# Patient Record
Sex: FEMALE | Race: BLACK OR AFRICAN AMERICAN | HISPANIC OR LATINO | Employment: FULL TIME | URBAN - METROPOLITAN AREA
[De-identification: names, ages, dates, MRNs, and addresses within clinical notes are randomized per-mention and may not be internally consistent; named-entity substitution may affect disease eponyms.]

---

## 2017-01-23 ENCOUNTER — HOSPITAL ENCOUNTER (EMERGENCY)
Facility: HOSPITAL | Age: 27
Discharge: HOME | End: 2017-01-23
Attending: EMERGENCY MEDICINE | Admitting: EMERGENCY MEDICINE

## 2017-01-23 VITALS
TEMPERATURE: 98.5 F | WEIGHT: 160 LBS | OXYGEN SATURATION: 100 % | DIASTOLIC BLOOD PRESSURE: 60 MMHG | SYSTOLIC BLOOD PRESSURE: 129 MMHG | RESPIRATION RATE: 17 BRPM | HEART RATE: 108 BPM

## 2017-01-23 DIAGNOSIS — R10.13 ACUTE EPIGASTRIC PAIN: Primary | ICD-10-CM

## 2017-01-23 LAB
ALBUMIN SERPL BCP-MCNC: 3.4 G/DL (ref 3.5–5)
ALP SERPL-CCNC: 64 U/L (ref 46–116)
ALT SERPL W P-5'-P-CCNC: 23 U/L (ref 12–78)
ANION GAP SERPL CALCULATED.3IONS-SCNC: 10 MMOL/L (ref 4–13)
AST SERPL W P-5'-P-CCNC: 18 U/L (ref 5–45)
BACTERIA UR QL AUTO: ABNORMAL /HPF
BASOPHILS # BLD AUTO: 0.02 THOUSANDS/ΜL (ref 0–0.1)
BASOPHILS NFR BLD AUTO: 0 % (ref 0–1)
BILIRUB SERPL-MCNC: 0.34 MG/DL (ref 0.2–1)
BILIRUB UR QL STRIP: NEGATIVE
BUN SERPL-MCNC: 11 MG/DL (ref 5–25)
CALCIUM SERPL-MCNC: 8.7 MG/DL (ref 8.3–10.1)
CHLORIDE SERPL-SCNC: 104 MMOL/L (ref 100–108)
CLARITY UR: CLEAR
CO2 SERPL-SCNC: 26 MMOL/L (ref 21–32)
COLOR UR: YELLOW
CREAT SERPL-MCNC: 0.53 MG/DL (ref 0.6–1.3)
EOSINOPHIL # BLD AUTO: 0.02 THOUSAND/ΜL (ref 0–0.61)
EOSINOPHIL NFR BLD AUTO: 0 % (ref 0–6)
ERYTHROCYTE [DISTWIDTH] IN BLOOD BY AUTOMATED COUNT: 15.9 % (ref 11.6–15.1)
GFR SERPL CREATININE-BSD FRML MDRD: >60 ML/MIN/1.73SQ M
GLUCOSE SERPL-MCNC: 92 MG/DL (ref 65–140)
GLUCOSE UR STRIP-MCNC: NEGATIVE MG/DL
HCG UR QL: NEGATIVE
HCT VFR BLD AUTO: 41 % (ref 34.8–46.1)
HGB BLD-MCNC: 13.1 G/DL (ref 11.5–15.4)
HGB UR QL STRIP.AUTO: ABNORMAL
KETONES UR STRIP-MCNC: NEGATIVE MG/DL
LEUKOCYTE ESTERASE UR QL STRIP: NEGATIVE
LIPASE SERPL-CCNC: 119 U/L (ref 73–393)
LYMPHOCYTES # BLD AUTO: 1.06 THOUSANDS/ΜL (ref 0.6–4.47)
LYMPHOCYTES NFR BLD AUTO: 10 % (ref 14–44)
MCH RBC QN AUTO: 24.3 PG (ref 26.8–34.3)
MCHC RBC AUTO-ENTMCNC: 32 G/DL (ref 31.4–37.4)
MCV RBC AUTO: 76 FL (ref 82–98)
MONOCYTES # BLD AUTO: 0.53 THOUSAND/ΜL (ref 0.17–1.22)
MONOCYTES NFR BLD AUTO: 5 % (ref 4–12)
MUCOUS THREADS UR QL AUTO: ABNORMAL
NEUTROPHILS # BLD AUTO: 8.61 THOUSANDS/ΜL (ref 1.85–7.62)
NEUTS SEG NFR BLD AUTO: 85 % (ref 43–75)
NITRITE UR QL STRIP: NEGATIVE
NON-SQ EPI CELLS URNS QL MICRO: ABNORMAL /HPF
NRBC BLD AUTO-RTO: 0 /100 WBCS
PH UR STRIP.AUTO: 5.5 [PH] (ref 4.5–8)
PLATELET # BLD AUTO: 270 THOUSANDS/UL (ref 149–390)
PMV BLD AUTO: 9.8 FL (ref 8.9–12.7)
POTASSIUM SERPL-SCNC: 3.7 MMOL/L (ref 3.5–5.3)
PROT SERPL-MCNC: 7.4 G/DL (ref 6.4–8.2)
PROT UR STRIP-MCNC: NEGATIVE MG/DL
RBC # BLD AUTO: 5.38 MILLION/UL (ref 3.81–5.12)
RBC #/AREA URNS AUTO: ABNORMAL /HPF
SODIUM SERPL-SCNC: 140 MMOL/L (ref 136–145)
SP GR UR STRIP.AUTO: >=1.03 (ref 1–1.03)
UROBILINOGEN UR QL STRIP.AUTO: 0.2 E.U./DL
WBC # BLD AUTO: 10.24 THOUSAND/UL (ref 4.31–10.16)
WBC #/AREA URNS AUTO: ABNORMAL /HPF

## 2017-01-23 PROCEDURE — 99283 EMERGENCY DEPT VISIT LOW MDM: CPT

## 2017-01-23 PROCEDURE — 80053 COMPREHEN METABOLIC PANEL: CPT | Performed by: EMERGENCY MEDICINE

## 2017-01-23 PROCEDURE — 85025 COMPLETE CBC W/AUTO DIFF WBC: CPT | Performed by: EMERGENCY MEDICINE

## 2017-01-23 PROCEDURE — 81002 URINALYSIS NONAUTO W/O SCOPE: CPT

## 2017-01-23 PROCEDURE — 87086 URINE CULTURE/COLONY COUNT: CPT

## 2017-01-23 PROCEDURE — 81025 URINE PREGNANCY TEST: CPT

## 2017-01-23 PROCEDURE — 83690 ASSAY OF LIPASE: CPT | Performed by: EMERGENCY MEDICINE

## 2017-01-23 PROCEDURE — 81001 URINALYSIS AUTO W/SCOPE: CPT

## 2017-01-23 PROCEDURE — 36415 COLL VENOUS BLD VENIPUNCTURE: CPT | Performed by: EMERGENCY MEDICINE

## 2017-01-23 PROCEDURE — 96360 HYDRATION IV INFUSION INIT: CPT

## 2017-01-23 RX ORDER — MAGNESIUM HYDROXIDE/ALUMINUM HYDROXICE/SIMETHICONE 120; 1200; 1200 MG/30ML; MG/30ML; MG/30ML
30 SUSPENSION ORAL ONCE
Status: COMPLETED | OUTPATIENT
Start: 2017-01-23 | End: 2017-01-23

## 2017-01-23 RX ORDER — FAMOTIDINE 20 MG/1
20 TABLET, FILM COATED ORAL 2 TIMES DAILY
Qty: 14 TABLET | Refills: 0 | Status: SHIPPED | OUTPATIENT
Start: 2017-01-23 | End: 2017-01-30

## 2017-01-23 RX ORDER — SUCRALFATE 1 G/1
1 TABLET ORAL 4 TIMES DAILY
Qty: 28 TABLET | Refills: 0 | Status: SHIPPED | OUTPATIENT
Start: 2017-01-23 | End: 2018-03-18 | Stop reason: HOSPADM

## 2017-01-23 RX ADMIN — ALUMINUM HYDROXIDE, MAGNESIUM HYDROXIDE, AND SIMETHICONE 30 ML: 200; 200; 20 SUSPENSION ORAL at 17:19

## 2017-01-23 RX ADMIN — SODIUM CHLORIDE 1000 ML: 0.9 INJECTION, SOLUTION INTRAVENOUS at 17:20

## 2017-01-23 RX ADMIN — LIDOCAINE HYDROCHLORIDE 15 ML: 20 SOLUTION ORAL; TOPICAL at 17:19

## 2017-01-24 LAB — BACTERIA UR CULT: NORMAL

## 2017-03-24 ENCOUNTER — HOSPITAL ENCOUNTER (EMERGENCY)
Facility: HOSPITAL | Age: 27
Discharge: HOME | End: 2017-03-25
Admitting: EMERGENCY MEDICINE
Payer: COMMERCIAL

## 2017-03-24 VITALS
SYSTOLIC BLOOD PRESSURE: 111 MMHG | DIASTOLIC BLOOD PRESSURE: 56 MMHG | OXYGEN SATURATION: 99 % | RESPIRATION RATE: 16 BRPM | TEMPERATURE: 98.6 F | HEART RATE: 88 BPM | WEIGHT: 155 LBS

## 2017-03-24 DIAGNOSIS — R39.9 UTI SYMPTOMS: Primary | ICD-10-CM

## 2017-03-24 LAB
BILIRUB UR QL STRIP: NEGATIVE
CLARITY UR: CLEAR
CLARITY, POC: CLEAR
COLOR UR: YELLOW
COLOR, POC: YELLOW
GLUCOSE UR STRIP-MCNC: NEGATIVE MG/DL
HCG UR QL: NEGATIVE
HGB UR QL STRIP.AUTO: NEGATIVE
KETONES UR STRIP-MCNC: NEGATIVE MG/DL
LEUKOCYTE ESTERASE UR QL STRIP: NEGATIVE
NITRITE UR QL STRIP: NEGATIVE
PH UR STRIP.AUTO: 7 [PH] (ref 4.5–8)
PROT UR STRIP-MCNC: NEGATIVE MG/DL
SP GR UR STRIP.AUTO: 1.02 (ref 1–1.03)
UROBILINOGEN UR QL STRIP.AUTO: 1 E.U./DL

## 2017-03-24 PROCEDURE — 81003 URINALYSIS AUTO W/O SCOPE: CPT

## 2017-03-24 PROCEDURE — 81025 URINE PREGNANCY TEST: CPT | Performed by: PHYSICIAN ASSISTANT

## 2017-03-24 PROCEDURE — 81002 URINALYSIS NONAUTO W/O SCOPE: CPT | Performed by: PHYSICIAN ASSISTANT

## 2017-03-24 RX ORDER — CEPHALEXIN 500 MG/1
500 CAPSULE ORAL 2 TIMES DAILY
Qty: 6 CAPSULE | Refills: 0 | Status: SHIPPED | OUTPATIENT
Start: 2017-03-24 | End: 2017-03-27

## 2017-03-25 PROCEDURE — 99283 EMERGENCY DEPT VISIT LOW MDM: CPT

## 2017-07-15 ENCOUNTER — HOSPITAL ENCOUNTER (EMERGENCY)
Facility: HOSPITAL | Age: 27
Discharge: HOME | End: 2017-07-15
Attending: EMERGENCY MEDICINE | Admitting: EMERGENCY MEDICINE
Payer: COMMERCIAL

## 2017-07-15 VITALS
SYSTOLIC BLOOD PRESSURE: 114 MMHG | OXYGEN SATURATION: 99 % | RESPIRATION RATE: 18 BRPM | TEMPERATURE: 97.9 F | DIASTOLIC BLOOD PRESSURE: 66 MMHG | WEIGHT: 172 LBS | HEART RATE: 87 BPM

## 2017-07-15 DIAGNOSIS — Z34.90 PREGNANCY: ICD-10-CM

## 2017-07-15 DIAGNOSIS — J02.9 ACUTE VIRAL PHARYNGITIS: Primary | ICD-10-CM

## 2017-07-15 DIAGNOSIS — H92.02 OTALGIA OF LEFT EAR: ICD-10-CM

## 2017-07-15 PROCEDURE — 99282 EMERGENCY DEPT VISIT SF MDM: CPT

## 2017-07-24 ENCOUNTER — GENERIC CONVERSION - ENCOUNTER (OUTPATIENT)
Dept: OTHER | Facility: OTHER | Age: 27
End: 2017-07-24

## 2017-07-24 ENCOUNTER — ALLSCRIPTS OFFICE VISIT (OUTPATIENT)
Dept: OTHER | Facility: OTHER | Age: 27
End: 2017-07-24

## 2017-07-24 DIAGNOSIS — Z34.91 ENCOUNTER FOR SUPERVISION OF NORMAL PREGNANCY IN FIRST TRIMESTER: ICD-10-CM

## 2017-08-07 ENCOUNTER — HOSPITAL ENCOUNTER (EMERGENCY)
Facility: HOSPITAL | Age: 27
Discharge: HOME | End: 2017-08-07
Attending: EMERGENCY MEDICINE
Payer: COMMERCIAL

## 2017-08-07 ENCOUNTER — APPOINTMENT (OUTPATIENT)
Dept: LAB | Facility: HOSPITAL | Age: 27
End: 2017-08-07
Payer: COMMERCIAL

## 2017-08-07 VITALS
SYSTOLIC BLOOD PRESSURE: 121 MMHG | DIASTOLIC BLOOD PRESSURE: 61 MMHG | HEART RATE: 84 BPM | OXYGEN SATURATION: 98 % | TEMPERATURE: 98.9 F | RESPIRATION RATE: 18 BRPM | WEIGHT: 175.4 LBS

## 2017-08-07 DIAGNOSIS — K59.00 CONSTIPATION: ICD-10-CM

## 2017-08-07 DIAGNOSIS — Z34.90 INTRAUTERINE PREGNANCY: Primary | ICD-10-CM

## 2017-08-07 DIAGNOSIS — R10.9 ACUTE ABDOMINAL PAIN: ICD-10-CM

## 2017-08-07 DIAGNOSIS — Z34.91 ENCOUNTER FOR SUPERVISION OF NORMAL PREGNANCY IN FIRST TRIMESTER: ICD-10-CM

## 2017-08-07 LAB
ABO GROUP BLD: NORMAL
BACTERIA UR QL AUTO: ABNORMAL /HPF
BACTERIA UR QL AUTO: ABNORMAL /HPF
BASOPHILS # BLD AUTO: 0.02 THOUSANDS/ΜL (ref 0–0.1)
BASOPHILS NFR BLD AUTO: 0 % (ref 0–1)
BILIRUB UR QL STRIP: NEGATIVE
BILIRUB UR QL STRIP: NEGATIVE
BLD GP AB SCN SERPL QL: NEGATIVE
CAOX CRY URNS QL MICRO: ABNORMAL /HPF
CLARITY UR: ABNORMAL
CLARITY UR: CLEAR
COLOR UR: YELLOW
COLOR UR: YELLOW
EOSINOPHIL # BLD AUTO: 0.02 THOUSAND/ΜL (ref 0–0.61)
EOSINOPHIL NFR BLD AUTO: 0 % (ref 0–6)
ERYTHROCYTE [DISTWIDTH] IN BLOOD BY AUTOMATED COUNT: 15.8 % (ref 11.6–15.1)
EXTERNAL GROUP B STREP ANTIGEN: POSITIVE
GLUCOSE 1H P 50 G GLC PO SERPL-MCNC: 123 MG/DL
GLUCOSE UR STRIP-MCNC: NEGATIVE MG/DL
GLUCOSE UR STRIP-MCNC: NEGATIVE MG/DL
HCT VFR BLD AUTO: 42.3 % (ref 34.8–46.1)
HGB BLD-MCNC: 14.1 G/DL (ref 11.5–15.4)
HGB UR QL STRIP.AUTO: ABNORMAL
HGB UR QL STRIP.AUTO: ABNORMAL
KETONES UR STRIP-MCNC: ABNORMAL MG/DL
KETONES UR STRIP-MCNC: NEGATIVE MG/DL
LEUKOCYTE ESTERASE UR QL STRIP: NEGATIVE
LEUKOCYTE ESTERASE UR QL STRIP: NEGATIVE
LYMPHOCYTES # BLD AUTO: 2.13 THOUSANDS/ΜL (ref 0.6–4.47)
LYMPHOCYTES NFR BLD AUTO: 21 % (ref 14–44)
MCH RBC QN AUTO: 27.2 PG (ref 26.8–34.3)
MCHC RBC AUTO-ENTMCNC: 33.3 G/DL (ref 31.4–37.4)
MCV RBC AUTO: 82 FL (ref 82–98)
MONOCYTES # BLD AUTO: 0.73 THOUSAND/ΜL (ref 0.17–1.22)
MONOCYTES NFR BLD AUTO: 7 % (ref 4–12)
MUCOUS THREADS UR QL AUTO: ABNORMAL
MUCOUS THREADS UR QL AUTO: ABNORMAL
NEUTROPHILS # BLD AUTO: 7.25 THOUSANDS/ΜL (ref 1.85–7.62)
NEUTS SEG NFR BLD AUTO: 72 % (ref 43–75)
NITRITE UR QL STRIP: NEGATIVE
NITRITE UR QL STRIP: NEGATIVE
NON-SQ EPI CELLS URNS QL MICRO: ABNORMAL /HPF
NON-SQ EPI CELLS URNS QL MICRO: ABNORMAL /HPF
NRBC BLD AUTO-RTO: 0 /100 WBCS
PH UR STRIP.AUTO: 6 [PH] (ref 4.5–8)
PH UR STRIP.AUTO: 6 [PH] (ref 4.5–8)
PLATELET # BLD AUTO: 273 THOUSANDS/UL (ref 149–390)
PMV BLD AUTO: 10.1 FL (ref 8.9–12.7)
PROT UR STRIP-MCNC: NEGATIVE MG/DL
PROT UR STRIP-MCNC: NEGATIVE MG/DL
RBC # BLD AUTO: 5.19 MILLION/UL (ref 3.81–5.12)
RBC #/AREA URNS AUTO: ABNORMAL /HPF
RBC #/AREA URNS AUTO: ABNORMAL /HPF
RH BLD: POSITIVE
SP GR UR STRIP.AUTO: 1.02 (ref 1–1.03)
SP GR UR STRIP.AUTO: 1.02 (ref 1–1.03)
SPECIMEN EXPIRATION DATE: NORMAL
UROBILINOGEN UR QL STRIP.AUTO: 1 E.U./DL
UROBILINOGEN UR QL STRIP.AUTO: 1 E.U./DL
WBC # BLD AUTO: 10.15 THOUSAND/UL (ref 4.31–10.16)
WBC #/AREA URNS AUTO: ABNORMAL /HPF
WBC #/AREA URNS AUTO: ABNORMAL /HPF

## 2017-08-07 PROCEDURE — 80081 OBSTETRIC PANEL INC HIV TSTG: CPT

## 2017-08-07 PROCEDURE — 87147 CULTURE TYPE IMMUNOLOGIC: CPT

## 2017-08-07 PROCEDURE — 81220 CFTR GENE COM VARIANTS: CPT

## 2017-08-07 PROCEDURE — 87086 URINE CULTURE/COLONY COUNT: CPT

## 2017-08-07 PROCEDURE — 81002 URINALYSIS NONAUTO W/O SCOPE: CPT | Performed by: EMERGENCY MEDICINE

## 2017-08-07 PROCEDURE — 81001 URINALYSIS AUTO W/SCOPE: CPT

## 2017-08-07 PROCEDURE — 82950 GLUCOSE TEST: CPT

## 2017-08-07 PROCEDURE — 99284 EMERGENCY DEPT VISIT MOD MDM: CPT

## 2017-08-07 PROCEDURE — 36415 COLL VENOUS BLD VENIPUNCTURE: CPT

## 2017-08-07 RX ORDER — ONDANSETRON 4 MG/1
4 TABLET, FILM COATED ORAL EVERY 6 HOURS
Qty: 6 TABLET | Refills: 0 | Status: ON HOLD | OUTPATIENT
Start: 2017-08-07 | End: 2017-11-17 | Stop reason: ALTCHOICE

## 2017-08-07 RX ORDER — POLYETHYLENE GLYCOL 3350 17 G/17G
17 POWDER, FOR SOLUTION ORAL DAILY
Qty: 7 EACH | Refills: 0 | Status: ON HOLD | OUTPATIENT
Start: 2017-08-07 | End: 2017-11-17 | Stop reason: ALTCHOICE

## 2017-08-08 ENCOUNTER — GENERIC CONVERSION - ENCOUNTER (OUTPATIENT)
Dept: OTHER | Facility: OTHER | Age: 27
End: 2017-08-08

## 2017-08-08 LAB
BACTERIA UR CULT: NORMAL
BACTERIA UR CULT: NORMAL
HBV SURFACE AG SER QL: NORMAL
RPR SER QL: NORMAL
RUBV IGG SERPL IA-ACNC: 9.6 IU/ML

## 2017-08-09 ENCOUNTER — GENERIC CONVERSION - ENCOUNTER (OUTPATIENT)
Dept: OTHER | Facility: OTHER | Age: 27
End: 2017-08-09

## 2017-08-09 LAB — HIV 1+2 AB+HIV1 P24 AG SERPL QL IA: NORMAL

## 2017-08-14 LAB
CF COMMENT: NORMAL
CFTR MUT ANL BLD/T: NORMAL

## 2017-08-24 ENCOUNTER — ALLSCRIPTS OFFICE VISIT (OUTPATIENT)
Dept: OTHER | Facility: OTHER | Age: 27
End: 2017-08-24

## 2017-08-24 LAB
GLUCOSE (HISTORICAL): NORMAL
HGB UR QL STRIP.AUTO: NORMAL
KETONES UR STRIP-MCNC: NORMAL MG/DL
LEUKOCYTE ESTERASE UR QL STRIP: NORMAL
NITRITE UR QL STRIP: NORMAL
PROT UR STRIP-MCNC: NORMAL MG/DL

## 2017-08-25 ENCOUNTER — LAB REQUISITION (OUTPATIENT)
Dept: LAB | Facility: HOSPITAL | Age: 27
End: 2017-08-25
Payer: COMMERCIAL

## 2017-08-25 DIAGNOSIS — Z34.91 ENCOUNTER FOR SUPERVISION OF NORMAL PREGNANCY IN FIRST TRIMESTER: ICD-10-CM

## 2017-08-25 DIAGNOSIS — Z72.51 HIGH RISK HETEROSEXUAL BEHAVIOR: ICD-10-CM

## 2017-08-25 LAB
CHLAMYDIA DNA CVX QL NAA+PROBE: NORMAL
N GONORRHOEA DNA GENITAL QL NAA+PROBE: NORMAL

## 2017-08-25 PROCEDURE — 87591 N.GONORRHOEAE DNA AMP PROB: CPT | Performed by: OBSTETRICS & GYNECOLOGY

## 2017-08-25 PROCEDURE — 87491 CHLMYD TRACH DNA AMP PROBE: CPT | Performed by: OBSTETRICS & GYNECOLOGY

## 2017-09-15 ENCOUNTER — HOSPITAL ENCOUNTER (EMERGENCY)
Facility: HOSPITAL | Age: 27
Discharge: HOME | End: 2017-09-15
Attending: EMERGENCY MEDICINE
Payer: COMMERCIAL

## 2017-09-15 VITALS
OXYGEN SATURATION: 96 % | RESPIRATION RATE: 16 BRPM | SYSTOLIC BLOOD PRESSURE: 110 MMHG | WEIGHT: 172 LBS | HEART RATE: 84 BPM | DIASTOLIC BLOOD PRESSURE: 59 MMHG | TEMPERATURE: 98.7 F

## 2017-09-15 DIAGNOSIS — R10.9 ABDOMINAL PAIN IN PREGNANCY, SECOND TRIMESTER: Primary | ICD-10-CM

## 2017-09-15 DIAGNOSIS — O26.892 ABDOMINAL PAIN IN PREGNANCY, SECOND TRIMESTER: Primary | ICD-10-CM

## 2017-09-15 LAB
BACTERIA UR QL AUTO: NORMAL /HPF
BILIRUB UR QL STRIP: NEGATIVE
CLARITY UR: CLEAR
COLOR UR: YELLOW
GLUCOSE UR STRIP-MCNC: NEGATIVE MG/DL
HGB UR QL STRIP.AUTO: ABNORMAL
KETONES UR STRIP-MCNC: NEGATIVE MG/DL
LEUKOCYTE ESTERASE UR QL STRIP: NEGATIVE
MUCOUS THREADS UR QL AUTO: NORMAL
NITRITE UR QL STRIP: NEGATIVE
NON-SQ EPI CELLS URNS QL MICRO: NORMAL /HPF
PH UR STRIP.AUTO: 6 [PH] (ref 4.5–8)
PROT UR STRIP-MCNC: NEGATIVE MG/DL
RBC #/AREA URNS AUTO: NORMAL /HPF
SP GR UR STRIP.AUTO: >=1.03 (ref 1–1.03)
UROBILINOGEN UR QL STRIP.AUTO: 1 E.U./DL
WBC #/AREA URNS AUTO: NORMAL /HPF

## 2017-09-15 PROCEDURE — 99284 EMERGENCY DEPT VISIT MOD MDM: CPT

## 2017-09-15 PROCEDURE — 81001 URINALYSIS AUTO W/SCOPE: CPT

## 2017-09-15 PROCEDURE — 81002 URINALYSIS NONAUTO W/O SCOPE: CPT | Performed by: EMERGENCY MEDICINE

## 2017-09-21 ENCOUNTER — GENERIC CONVERSION - ENCOUNTER (OUTPATIENT)
Dept: OTHER | Facility: OTHER | Age: 27
End: 2017-09-21

## 2017-09-21 DIAGNOSIS — O14.90 PRE-ECLAMPSIA: ICD-10-CM

## 2017-09-21 DIAGNOSIS — Z34.82 ENCOUNTER FOR SUPERVISION OF OTHER NORMAL PREGNANCY, SECOND TRIMESTER: ICD-10-CM

## 2017-10-19 ENCOUNTER — GENERIC CONVERSION - ENCOUNTER (OUTPATIENT)
Dept: OTHER | Facility: OTHER | Age: 27
End: 2017-10-19

## 2017-10-30 ENCOUNTER — GENERIC CONVERSION - ENCOUNTER (OUTPATIENT)
Dept: OTHER | Facility: OTHER | Age: 27
End: 2017-10-30

## 2017-10-30 ENCOUNTER — ALLSCRIPTS OFFICE VISIT (OUTPATIENT)
Dept: PERINATAL CARE | Facility: CLINIC | Age: 27
End: 2017-10-30
Payer: COMMERCIAL

## 2017-10-30 PROCEDURE — 76811 OB US DETAILED SNGL FETUS: CPT | Performed by: OBSTETRICS & GYNECOLOGY

## 2017-10-30 PROCEDURE — 76817 TRANSVAGINAL US OBSTETRIC: CPT | Performed by: OBSTETRICS & GYNECOLOGY

## 2017-11-08 ENCOUNTER — HOSPITAL ENCOUNTER (OUTPATIENT)
Facility: HOSPITAL | Age: 27
Discharge: HOME | End: 2017-11-08
Attending: OBSTETRICS & GYNECOLOGY | Admitting: OBSTETRICS & GYNECOLOGY
Payer: COMMERCIAL

## 2017-11-08 VITALS
SYSTOLIC BLOOD PRESSURE: 113 MMHG | TEMPERATURE: 98.1 F | WEIGHT: 182 LBS | DIASTOLIC BLOOD PRESSURE: 55 MMHG | BODY MASS INDEX: 40.94 KG/M2 | HEART RATE: 94 BPM | OXYGEN SATURATION: 98 % | HEIGHT: 56 IN | RESPIRATION RATE: 18 BRPM

## 2017-11-08 DIAGNOSIS — N93.9 VAGINAL SPOTTING: ICD-10-CM

## 2017-11-08 PROBLEM — J45.909 ASTHMA: Status: ACTIVE | Noted: 2017-11-08

## 2017-11-08 LAB
BILIRUB UR QL STRIP: NEGATIVE
CLARITY UR: CLEAR
COLOR UR: YELLOW
GLUCOSE UR STRIP-MCNC: NEGATIVE MG/DL
HGB UR QL STRIP.AUTO: NEGATIVE
KETONES UR STRIP-MCNC: ABNORMAL MG/DL
LEUKOCYTE ESTERASE UR QL STRIP: NEGATIVE
NITRITE UR QL STRIP: NEGATIVE
PH UR STRIP.AUTO: 6.5 [PH] (ref 4.5–8)
PROT UR STRIP-MCNC: NEGATIVE MG/DL
SP GR UR STRIP.AUTO: 1.02 (ref 1–1.03)
UROBILINOGEN UR QL STRIP.AUTO: 2 E.U./DL

## 2017-11-08 PROCEDURE — 99202 OFFICE O/P NEW SF 15 MIN: CPT

## 2017-11-08 PROCEDURE — 81003 URINALYSIS AUTO W/O SCOPE: CPT | Performed by: FAMILY MEDICINE

## 2017-11-08 RX ORDER — ALBUTEROL SULFATE 90 UG/1
1 AEROSOL, METERED RESPIRATORY (INHALATION) EVERY 4 HOURS PRN
COMMUNITY
End: 2017-11-08 | Stop reason: HOSPADM

## 2017-11-08 RX ORDER — VITAMIN A ACETATE, .BETA.-CAROTENE, ASCORBIC ACID, CHOLECALCIFEROL, .ALPHA.-TOCOPHEROL ACETATE, DL-, THIAMINE MONONITRATE, RIBOFLAVIN, NIACINAMIDE, PYRIDOXINE HYDROCHLORIDE, FOLIC ACID, CYANOCOBALAMIN, CALCIUM CARBONATE, FERROUS FUMARATE, ZINC OXIDE, AND CUPRIC OXIDE 2000; 2000; 120; 400; 22; 1.84; 3; 20; 10; 1; 12; 200; 27; 25; 2 [IU]/1; [IU]/1; MG/1; [IU]/1; MG/1; MG/1; MG/1; MG/1; MG/1; MG/1; UG/1; MG/1; MG/1; MG/1; MG/1
1 TABLET ORAL DAILY
COMMUNITY
End: 2018-05-31

## 2017-11-08 RX ORDER — FAMOTIDINE 10 MG
1 TABLET ORAL DAILY PRN
COMMUNITY
Start: 2017-08-24 | End: 2017-11-08 | Stop reason: HOSPADM

## 2017-11-08 NOTE — LETTER
November 8, 2017     Patient: Danny Sample   YOB: 1990   Date of Visit: 11/8/2017       To Whom It May Concern:    Mario Ennis should be restricted to no more than 20 pounds at all times during her work shifts  It should also be noted that Mario Ennis was present during the morning of 11/8/2017 to be evaluated for a health concern, please take that in consideration with her schedule    If you have any questions or concerns, please don't hesitate to call, [phone]         Sincerely,          Maurisio Browne MD  11/08/17  9:05 AM

## 2017-11-08 NOTE — PROGRESS NOTES
L&D Triage Note - OB/GYN  Bar Loco 27 y o  female MRN: [id number]  Unit/Bed#: L&D 329-01 Encounter: [id number]      Assessment:  27 y o   at 18w7d who presented with vaginal spotting since earlier this morning  Pre-term labor was negative and cervical length of 33mm  Plan:  1  Advised adequate hydration  2  Provided a note for work with restrictions of only 20 pounds to lift  3  D/C home     Discussed with Dr Noreen Goldberg       Chief Compliant: Vaginal spotting     TIME: 7:49 AM    Subjective:  27 y o   at 21w5d who presented today for vaginal spotting noted after urinating on morning of triage  Feeling cramping but no contractions  Patient mentioned that she stands on her feet a lot for work but no adequate hydration  +FM, no leaking or gush of fluids, no sexual intercourse in the last 24 hours  Patient is also requesting a note for work to restrict how much she is allowed to carry at work        Objective:  Vitals:    17 0706   BP: 115/56   Pulse: 83   Resp: 16   Temp: 98 4 °F (36 9 °C)   SpO2: 98%       SVE: Deferred   FHT:  160 / Moderate 6 - 25 bpm / no decels, category I [place]: quiet  Cervical length: 33mm, performed with Dr Sole Maki mount and CHRISTELLE are negative    Vernetta Runner MD 2017 7:49 AM

## 2017-11-17 ENCOUNTER — OFFICE VISIT (OUTPATIENT)
Dept: URGENT CARE | Age: 27
End: 2017-11-17
Payer: OTHER MISCELLANEOUS

## 2017-11-17 ENCOUNTER — HOSPITAL ENCOUNTER (OUTPATIENT)
Facility: HOSPITAL | Age: 27
Discharge: HOME | End: 2017-11-17
Attending: OBSTETRICS & GYNECOLOGY | Admitting: OBSTETRICS & GYNECOLOGY
Payer: COMMERCIAL

## 2017-11-17 ENCOUNTER — HOSPITAL ENCOUNTER (OUTPATIENT)
Facility: HOSPITAL | Age: 27
Setting detail: OBSERVATION
End: 2017-11-17
Attending: OBSTETRICS & GYNECOLOGY | Admitting: OBSTETRICS & GYNECOLOGY
Payer: COMMERCIAL

## 2017-11-17 VITALS
TEMPERATURE: 98.5 F | HEART RATE: 104 BPM | DIASTOLIC BLOOD PRESSURE: 58 MMHG | RESPIRATION RATE: 20 BRPM | SYSTOLIC BLOOD PRESSURE: 117 MMHG

## 2017-11-17 VITALS
BODY MASS INDEX: 40.94 KG/M2 | WEIGHT: 182 LBS | HEART RATE: 83 BPM | HEIGHT: 56 IN | TEMPERATURE: 97.7 F | DIASTOLIC BLOOD PRESSURE: 58 MMHG | RESPIRATION RATE: 15 BRPM | OXYGEN SATURATION: 95 % | SYSTOLIC BLOOD PRESSURE: 119 MMHG

## 2017-11-17 DIAGNOSIS — Z3A.23 23 WEEKS GESTATION OF PREGNANCY: Primary | ICD-10-CM

## 2017-11-17 PROBLEM — O99.820 GROUP B STREPTOCOCCUS CARRIER, ANTEPARTUM: Status: ACTIVE | Noted: 2017-11-17

## 2017-11-17 PROBLEM — N93.9 VAGINAL SPOTTING: Status: RESOLVED | Noted: 2017-11-08 | Resolved: 2017-11-17

## 2017-11-17 PROBLEM — O44.40 LOW-LYING PLACENTA: Status: ACTIVE | Noted: 2017-11-17

## 2017-11-17 PROBLEM — Z98.891 HISTORY OF VAGINAL BIRTH AFTER CESAREAN: Status: ACTIVE | Noted: 2017-11-17

## 2017-11-17 PROBLEM — Z28.39 RUBELLA NON-IMMUNE STATUS, ANTEPARTUM: Status: ACTIVE | Noted: 2017-11-17

## 2017-11-17 PROBLEM — O09.292 HX OF PREECLAMPSIA, PRIOR PREGNANCY, CURRENTLY PREGNANT, SECOND TRIMESTER: Status: ACTIVE | Noted: 2017-11-17

## 2017-11-17 PROBLEM — O09.899 RUBELLA NON-IMMUNE STATUS, ANTEPARTUM: Status: ACTIVE | Noted: 2017-11-17

## 2017-11-17 LAB
ABO GROUP BLD: NORMAL
BASOPHILS # BLD AUTO: 0.02 THOUSANDS/ΜL (ref 0–0.1)
BASOPHILS NFR BLD AUTO: 0 % (ref 0–1)
BLD GP AB SCN SERPL QL: NEGATIVE
EOSINOPHIL # BLD AUTO: 0.01 THOUSAND/ΜL (ref 0–0.61)
EOSINOPHIL NFR BLD AUTO: 0 % (ref 0–6)
ERYTHROCYTE [DISTWIDTH] IN BLOOD BY AUTOMATED COUNT: 15.1 % (ref 11.6–15.1)
EXTERNAL GROUP B STREP ANTIGEN: POSITIVE
HCT VFR BLD AUTO: 35.3 % (ref 34.8–46.1)
HGB BLD-MCNC: 11.3 G/DL (ref 11.5–15.4)
LYMPHOCYTES # BLD AUTO: 1.81 THOUSANDS/ΜL (ref 0.6–4.47)
LYMPHOCYTES NFR BLD AUTO: 15 % (ref 14–44)
MCH RBC QN AUTO: 24.7 PG (ref 26.8–34.3)
MCHC RBC AUTO-ENTMCNC: 32 G/DL (ref 31.4–37.4)
MCV RBC AUTO: 77 FL (ref 82–98)
MONOCYTES # BLD AUTO: 0.74 THOUSAND/ΜL (ref 0.17–1.22)
MONOCYTES NFR BLD AUTO: 6 % (ref 4–12)
NEUTROPHILS # BLD AUTO: 9.61 THOUSANDS/ΜL (ref 1.85–7.62)
NEUTS SEG NFR BLD AUTO: 79 % (ref 43–75)
PLATELET # BLD AUTO: 266 THOUSANDS/UL (ref 149–390)
PMV BLD AUTO: 10.4 FL (ref 8.9–12.7)
RBC # BLD AUTO: 4.57 MILLION/UL (ref 3.81–5.12)
RH BLD: POSITIVE
SPECIMEN EXPIRATION DATE: NORMAL
WBC # BLD AUTO: 12.19 THOUSAND/UL (ref 4.31–10.16)

## 2017-11-17 PROCEDURE — 86592 SYPHILIS TEST NON-TREP QUAL: CPT | Performed by: OBSTETRICS & GYNECOLOGY

## 2017-11-17 PROCEDURE — 76815 OB US LIMITED FETUS(S): CPT | Performed by: OBSTETRICS & GYNECOLOGY

## 2017-11-17 PROCEDURE — 87086 URINE CULTURE/COLONY COUNT: CPT | Performed by: OBSTETRICS & GYNECOLOGY

## 2017-11-17 PROCEDURE — 85025 COMPLETE CBC W/AUTO DIFF WBC: CPT | Performed by: OBSTETRICS & GYNECOLOGY

## 2017-11-17 PROCEDURE — 99284 EMERGENCY DEPT VISIT MOD MDM: CPT | Performed by: PREVENTIVE MEDICINE

## 2017-11-17 PROCEDURE — 86900 BLOOD TYPING SEROLOGIC ABO: CPT | Performed by: OBSTETRICS & GYNECOLOGY

## 2017-11-17 PROCEDURE — 99213 OFFICE O/P EST LOW 20 MIN: CPT

## 2017-11-17 PROCEDURE — 86901 BLOOD TYPING SEROLOGIC RH(D): CPT | Performed by: OBSTETRICS & GYNECOLOGY

## 2017-11-17 PROCEDURE — 76817 TRANSVAGINAL US OBSTETRIC: CPT | Performed by: OBSTETRICS & GYNECOLOGY

## 2017-11-17 PROCEDURE — 86850 RBC ANTIBODY SCREEN: CPT | Performed by: OBSTETRICS & GYNECOLOGY

## 2017-11-17 PROCEDURE — G0379 DIRECT REFER HOSPITAL OBSERV: HCPCS

## 2017-11-17 PROCEDURE — G0383 LEV 4 HOSP TYPE B ED VISIT: HCPCS | Performed by: PREVENTIVE MEDICINE

## 2017-11-17 RX ORDER — INDOMETHACIN 25 MG/1
25 CAPSULE ORAL EVERY 6 HOURS
Status: DISCONTINUED | OUTPATIENT
Start: 2017-11-17 | End: 2017-11-17 | Stop reason: HOSPADM

## 2017-11-17 RX ORDER — MAGNESIUM SULFATE IN WATER 40 MG/ML
6 INJECTION, SOLUTION INTRAVENOUS ONCE
Status: COMPLETED | OUTPATIENT
Start: 2017-11-17 | End: 2017-11-17

## 2017-11-17 RX ORDER — NITROFURANTOIN 25; 75 MG/1; MG/1
100 CAPSULE ORAL 2 TIMES DAILY
Qty: 14 CAPSULE | Refills: 0 | Status: SHIPPED | OUTPATIENT
Start: 2017-11-17 | End: 2018-03-18 | Stop reason: HOSPADM

## 2017-11-17 RX ORDER — INDOMETHACIN 50 MG/1
50 CAPSULE ORAL ONCE
Status: COMPLETED | OUTPATIENT
Start: 2017-11-17 | End: 2017-11-17

## 2017-11-17 RX ORDER — ONDANSETRON 2 MG/ML
4 INJECTION INTRAMUSCULAR; INTRAVENOUS EVERY 8 HOURS PRN
Status: DISCONTINUED | OUTPATIENT
Start: 2017-11-17 | End: 2017-11-17 | Stop reason: HOSPADM

## 2017-11-17 RX ORDER — BETAMETHASONE SODIUM PHOSPHATE AND BETAMETHASONE ACETATE 3; 3 MG/ML; MG/ML
12 INJECTION, SUSPENSION INTRA-ARTICULAR; INTRALESIONAL; INTRAMUSCULAR; SOFT TISSUE EVERY 24 HOURS
Status: DISCONTINUED | OUTPATIENT
Start: 2017-11-17 | End: 2017-11-17 | Stop reason: HOSPADM

## 2017-11-17 RX ORDER — SODIUM CHLORIDE, SODIUM LACTATE, POTASSIUM CHLORIDE, CALCIUM CHLORIDE 600; 310; 30; 20 MG/100ML; MG/100ML; MG/100ML; MG/100ML
125 INJECTION, SOLUTION INTRAVENOUS CONTINUOUS
Status: DISCONTINUED | OUTPATIENT
Start: 2017-11-17 | End: 2017-11-17 | Stop reason: HOSPADM

## 2017-11-17 RX ADMIN — Medication 6 G: at 15:48

## 2017-11-17 RX ADMIN — BETAMETHASONE SODIUM PHOSPHATE AND BETAMETHASONE ACETATE 12 MG: 3; 3 INJECTION, SUSPENSION INTRA-ARTICULAR; INTRALESIONAL; INTRAMUSCULAR at 15:12

## 2017-11-17 RX ADMIN — SODIUM CHLORIDE 5 MILLION UNITS: 0.9 INJECTION, SOLUTION INTRAVENOUS at 15:47

## 2017-11-17 RX ADMIN — SODIUM CHLORIDE, SODIUM LACTATE, POTASSIUM CHLORIDE, AND CALCIUM CHLORIDE 125 ML/HR: .6; .31; .03; .02 INJECTION, SOLUTION INTRAVENOUS at 15:47

## 2017-11-17 RX ADMIN — MAGNESIUM SULFATE HEPTAHYDRATE 2 G/HR: 500 INJECTION, SOLUTION INTRAMUSCULAR; INTRAVENOUS at 16:17

## 2017-11-17 RX ADMIN — INDOMETHACIN 50 MG: 25 CAPSULE ORAL at 15:28

## 2017-11-17 NOTE — PROGRESS NOTES
TVUS performed as part of  labor assessment  Exam difficult secondary to cervical positioning and large amount of shadowing  Appears to have shortened cervix with funneling  See image attached below  Case reviewed with Perinatology  Requesting repeat cervical length by Perinatology  Until that time will treat as possible shortened cervix or  labor       Colt Allen MD  17  2:52 PM

## 2017-11-17 NOTE — H&P
History & Physical - OB/GYN   Edwardo Dickerson 27 y o  female MRN: [id number]  Unit/Bed#: L&D 329-01 Encounter: [id number]    39 y o  [id number] at 23w0d weeks by first trimester ultrasound at 10 weeks 6 days  She is a patient of [facility]    Chief complaint:  Cramping and pelvic pressure    Patient presents for evaluation of pelvic cramping and pressure  She states that at around 0720 this morning, while at her place of employment, she was bending over to move a moderately heavy box at which time she felt a pop/crack in her lower back  Soon after, she started to experience lower pelvic cramping and sensation of vaginal "poking"  Denies vaginal bleeding or leakage of fluid  Reports increasing intensity of back pain, which she said is made worse with position changes  Of note, patient was seen in triage on  for spotting and contractions  Cervical length was noted to be 3 3 cm  Pregnancy Complications:  Patient Active Problem List   Diagnosis    Vaginal spotting    Asthma       PMH:  Past Medical History:   Diagnosis Date    Asthma     Ear ache        PSH:  Past Surgical History:   Procedure Laterality Date     SECTION         Social Hx:  Denies x 3     OB Hx:  Obstetric History       T2      L2     SAB1   TAB0   Ectopic0   Multiple0   Live Births2       # Outcome Date GA Lbr Ralph/2nd Weight Sex Delivery Anes PTL Lv   4 Current            3 SAB 17 5w0d          2 Term 13 40w0d  3118 g (6 lb 14 oz) M Vag-Spont  N DANICA   1 Term 11 40w0d  3459 g (7 lb 10 oz) M CS-LTranv  N DANICA      Complications: Preeclampsia          Meds:  No current facility-administered medications on file prior to encounter        Current Outpatient Prescriptions on File Prior to Encounter   Medication Sig Dispense Refill    albuterol (PROVENTIL HFA,VENTOLIN HFA) 90 mcg/act inhaler Inhale 2 puffs every 6 (six) hours as needed for wheezing      carbamide peroxide (DEBROX) 6 5 % otic solution Administer 5 drops into the left ear 2 (two) times a day for 7 days 25 mL 0    famotidine (PEPCID) 20 mg tablet Take 1 tablet by mouth 2 (two) times a day for 7 days 14 tablet 0    Prenatal Vit-Fe Fumarate-FA (PNV PRENATAL PLUS MULTIVITAMIN) 27-1 MG TABS Take 1 tablet by mouth daily      sucralfate (CARAFATE) 1 g tablet Take 1 tablet by mouth 4 (four) times a day for 7 days 28 tablet 0    [DISCONTINUED] ondansetron (ZOFRAN) 4 mg tablet Take 1 tablet by mouth every 6 (six) hours 6 tablet 0    [DISCONTINUED] polyethylene glycol (MIRALAX) 17 g packet Take 17 g by mouth daily 7 each 0       Allergies:  No Known Allergies    Labs:  Blood type: A+  Antibody: negative  Group B strep: unknown  HIV: negative  Hepatitis B: negative  RPR: NR  Rubella: Immune  Varicella Unknown    Physical Exam:  /58   Pulse 83   Temp 97 7 °F (36 5 °C) (Oral)   Resp 14   LMP 06/15/2017 (Approximate) Comment: Two positive tests at home  SpO2 95%     Weight:  182 lb   Height:  4 foot 9 inches    HEENT: atraumatic, normocephalic  Heart: RRR, NMRG  Lungs: CTA b/l, no wrr  Abdomen: gravid, non-tender, non-distended  Extremities: non-tender, no edema    TVUS: Cervical Length 0 60 cm with 1 77 cm cervical funnel    SVE: closed / thick / high per Dr Muse Sensor  FHT:  160 dopplers  [place]: quiet    Membranes: intact    Assessment:   27 y o  [id number] at 23w0d weeks with sensation of pelvic pressure/cramping in the setting of  shortened cervical length    Plan:   1  Admit to L&D  2  CBC, RPR, type and screen  3  Prematurity: for betamethasone, magnesium sulfate for fetal neuroprotection, indomethacin for tocolysis per protocol  4   GBS unknown status: penicillin per protocol  Epidural upon request    For transfer to [facility] [place] for further management    Discussed with Dr Peg Infante DO

## 2017-11-17 NOTE — DISCHARGE INSTRUCTIONS
You received 1 dose of steroids, betamethasone, for fetal lung maturity due to concerns of  labor today at 15:15  In order to finish a full course of betamethasone you need to return tomorrow, 2017 at approximately 15:15 to receive a 2nd dose (injection)  Please return to [place] [address] and delivery unit tomorrow at 15:00 in order to receive this injection  Thank you  Urinary Tract Infection in Pregnancy   WHAT YOU NEED TO KNOW:   A urinary tract infection (UTI) is caused by bacteria that get inside your urinary tract  The urinary tract includes your kidneys and bladder  UTIs are common in women, especially during pregnancy  This is because of changes in your immune system, hormones, and uterus  As your uterus grows, your bladder may not completely empty  Bacteria can grow in the urine left in your bladder and cause a UTI  UTIs during pregnancy can increase your risk for a kidney infection and  labor  DISCHARGE INSTRUCTIONS:   Seek care immediately if:   · You are urinating very little or not at all  · You have severe pain  · You have a fever and chills  Contact your healthcare provider if:   · You have pain in the sides of your back  · You do not feel better after 2 days of treatment  · You are vomiting  · You have questions or concerns about your condition or care  Medicines:   · Antibiotics  help fight a bacterial infection  · Medicines  may be given to decrease pain and burning when you urinate  They will also help decrease the feeling that you need to urinate often  These medicines will make your urine orange or red  · Take your medicine as directed  Contact your healthcare provider if you think your medicine is not helping or if you have side effects  Tell him or her if you are allergic to any medicine  Keep a list of the medicines, vitamins, and herbs you take  Include the amounts, and when and why you take them   Bring the list or the pill bottles to follow-up visits  Carry your medicine list with you in case of an emergency  Prevent another UTI:   · Urinate when you feel the urge  Do not hold your urine  Urinate as soon as needed  Always urinate after you have sex  This helps flush out bacteria passed during sex  · Drink liquids as directed  Ask how much liquid to drink each day and which liquids are best for you  You may need to drink more fluids than usual to help flush bacteria out of your urinary tract  Do not drink caffeine or carbonated liquids  These drinks can irritate your bladder  Your healthcare provider may recommend cranberry juice to help prevent a UTI  · Wipe from front to back after you urinate or have a bowel movement  This will help prevent germs from getting into your urinary tract through your urethra  · Do pelvic muscle exercises often  Pelvic muscle exercises may help you start and stop urinating  Strong pelvic muscles may help you empty your bladder easier  Squeeze these muscles tightly for 5 seconds like you are trying to hold back urine  Then relax for 5 seconds  Gradually work up to squeezing for 10 seconds  Do 3 sets of 15 repetitions a day, or as directed  Follow up with your healthcare provider as directed: You may need to return for more urine tests  Write down your questions so you remember to ask them during your visits  © 2017 [address] [employer] Information is for End User's use only and may not be sold, redistributed or otherwise used for commercial purposes  All illustrations and images included in CareNotes® are the copyrighted property of A D A M , Inc  or Zach Caceres  The above information is an  only  It is not intended as medical advice for individual conditions or treatments  Talk to your doctor, nurse or pharmacist before following any medical regimen to see if it is safe and effective for you

## 2017-11-17 NOTE — EMTALA/ACUTE CARE TRANSFER
[address] AND DELIVERY  [facility] [address]  Dept: [phone]      ACUTE CARE TRANSFER CONSENT    NAME Miguel Campbell                                         1990                              MRN [id number]    I have been informed of my rights regarding examination, treatment, and transfer   by Dr Cyrus Encinas MD    Benefits: Specialized equipment and/or services available at the receiving facility (Include comment)________________________ (Level III NICU)    Risks: Potential for delay in receiving treatment, Potential deterioration of medical condition, Loss of IV, Increased discomfort during transfer, Possible worsening of condition or death during transfer, Other: (Include comment)__________________________ ( delivery outside of hospital)      Consent for Transfer:  I acknowledge that my medical condition has been evaluated and explained to me by the treating physician or other qualified medical person and/or my attending physician, who has recommended that I be transferred to the service of  Accepting Physician: Sruekha Wilkins at [address], [address] : [address] [facility]  The above potential benefits of such transfer, the potential risks associated with such transfer, and the probable risks of not being transferred have been explained to me, and I fully understand them  The doctor has explained that, in my case, the benefits of transfer outweigh the risks  I agree to be transferred  I authorize the performance of emergency medical procedures and treatments upon me in both transit and upon arrival at the receiving facility  Additionally, I authorize the release of any and all medical records to the receiving facility and request they be transported with me, if possible    I understand that the safest mode of transportation during a medical emergency is an ambulance and that the Hospital advocates the use of this mode of transport  Risks of traveling to the receiving facility by car, including absence of medical control, life sustaining equipment, such as oxygen, and medical personnel has been explained to me and I fully understand them  (ARIANA CORRECT BOX BELOW)  [  ]  I consent to the stated transfer and to be transported by ambulance/helicopter  [  ]  I consent to the stated transfer, but refuse transportation by ambulance and accept full responsibility for my transportation by car  I understand the risks of non-ambulance transfers and I exonerate the Hospital and its staff from any deterioration in my condition that results from this refusal     X___________________________________________    DATE  17  TIME________  Signature of patient or legally responsible individual signing on patient behalf           RELATIONSHIP TO PATIENT_________________________          Provider Certification    NAME Estefanía Cain                                        [facility] 1990                              MRN [id number]    A medical screening exam was performed on the above named patient    Based on the examination:    Condition Necessitating Transfer EGA <32wks, short cervix    Patient Condition: The patient has been stabilized such that within reasonable medical probability, no material deterioration of the patient condition or the condition of the unborn child(lelia) is likely to result from the transfer    Reason for Transfer: Level of Care needed not available at this facility    Transfer Requirements: [address]   · Space available and qualified personnel available for treatment as acknowledged by    · Agreed to accept transfer and to provide appropriate medical treatment as acknowledged by       Adrian Kirby  · Appropriate medical records of the examination and treatment of the patient are provided at the time of transfer   [address] _______  · Transfer will be performed by qualified personnel from    and appropriate transfer equipment as required, including the use of necessary and appropriate life support measures  Provider Certification: I have examined the patient and explained the following risks and benefits of being transferred/refusing transfer to the patient/family:  General risk, such as traffic hazards, adverse weather conditions, rough terrain or turbulence, possible failure of equipment (including vehicle or aircraft), or consequences of actions of persons outside the control of the transport personnel, Unanticipated needs of medical equipment and personnel during transport, Risk of worsening condition, The possibility of a transport vehicle being unavailable      Based on these reasonable risks and benefits to the patient and/or the unborn child(lelia), and based upon the information available at the time of the patients examination, I certify that the medical benefits reasonably to be expected from the provision of appropriate medical treatments at another medical facility outweigh the increasing risks, if any, to the individuals medical condition, and in the case of labor to the unborn child, from effecting the transfer      X____________________________________________ DATE 11/17/17        TIME_______      ORIGINAL - SEND TO MEDICAL RECORDS   COPY - SEND WITH PATIENT DURING TRANSFER

## 2017-11-17 NOTE — CONSULTS
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  She notes after this, she started having pelvic cramping and pelvic pressure  Does not feel the cramping is similar to ctxns  She denies LOF/VB  +FM    Patient underwent assessment for  labor  Pelvic ultrasound was notable for shortened cervix and funneling  She has been assessed a few other times during this pregnancy for similar complaints  Her last cervical length was done on 17 and was 3 3cm  Today it appears to be 0 6cm with a 1 77cm funnel         Pregnancy Complications:  Patient Active Problem List   Diagnosis    Asthma    23 weeks gestation of pregnancy    Rubella non-immune status, antepartum    History of vaginal birth after     Low-lying placenta    Group B Streptococcus carrier, antepartum    Hx of preeclampsia, prior pregnancy, currently pregnant, second trimester       PMH:  Past Medical History:   Diagnosis Date    Asthma     Ear ache     Severe preeclampsia        PSH:  Past Surgical History:   Procedure Laterality Date     SECTION         Social Hx:  Denies tobacco, drugs, EtOH    OB Hx:  Obstetric History       T2      L2     SAB1   TAB0   Ectopic0   Multiple0   Live Births2       # Outcome Date GA Lbr Ralph/2nd Weight Sex Delivery Anes PTL Lv   4 Current            3 SAB 17 5w0d          2 Term 13 40w0d  3118 g (6 lb 14 oz) M   N DANICA   1 Term 11 40w0d  3459 g (7 lb 10 oz) M CS-LTranv  N DANICA      Complications: Preeclampsia          Meds:  No current facility-administered medications on file prior to encounter  Current Outpatient Prescriptions on File Prior to Encounter   Medication Sig Dispense Refill    albuterol (PROVENTIL HFA,VENTOLIN HFA) 90 mcg/act inhaler Inhale 2 puffs every 6 (six) hours as needed for wheezing      carbamide peroxide (DEBROX) 6 5 % otic solution Administer 5 drops into the left ear 2 (two) times a day for 7 days 25 mL 0    famotidine (PEPCID) 20 mg tablet Take 1 tablet by mouth 2 (two) times a day for 7 days 14 tablet 0    Prenatal Vit-Fe Fumarate-FA (PNV PRENATAL PLUS MULTIVITAMIN) 27-1 MG TABS Take 1 tablet by mouth daily      sucralfate (CARAFATE) 1 g tablet Take 1 tablet by mouth 4 (four) times a day for 7 days 28 tablet 0    [DISCONTINUED] ondansetron (ZOFRAN) 4 mg tablet Take 1 tablet by mouth every 6 (six) hours 6 tablet 0    [DISCONTINUED] polyethylene glycol (MIRALAX) 17 g packet Take 17 g by mouth daily 7 each 0       Allergies:  No Known Allergies    Labs:  Blood type: A+  Antibody: negative  Group B strep: positive by 1st trimester UCx  HIV: negative  Hepatitis B: negative  RPR: NR  Rubella: Immune  1 hour Glucose: not yet indicated    Physical Exam:  /58   Pulse 83   Temp 97 7 °F (36 5 °C) (Oral)   Resp 14   LMP 06/15/2017 (Approximate) Comment: Two positive tests at home  SpO2 95%     HEENT: atraumatic, normocephalic  Heart: RRR, NMRG  Lungs: CTA b/l, no wrr  Abdomen: gravid, non-tender, non-distended  Extremities: non-tender, no edema      SVE: 0 / 0% / -4  FHT:  155bmp  Attempting cEFM but difficult due to fetal movement  [place]: quiet    Membranes: intact    Assessment:   27 y o  [id number] at 23w0d weeks with short cervix by ultrasound   Patient reporting cramping, but does not endorse ctxn and none appreciated on external toco  Discussed case with Perinatology, who recommends transfer to [facility] given periviability and will repeat ultrasound assessment when she arrives  Plan:   1  Short cervix  - Admit to L&D for transfer to [facility]  - BTM for fetal lung maturity  - Indocin for tocolysis for steroid benefit  - Magnesium sulfate for neuroprotection  - Vaginal progesterone 200mg qHS  - Neonatology consultation   2  GBS positive by urine  - PCN ppx  3  Low lying placenta  - Will be reassessed on M ultrasound  4  FEN  - Clears  - IVF  5  VTE ppx  - SCDs    Discussed with Dr Isra Aguilar

## 2017-11-17 NOTE — PROGRESS NOTES
Triage Note - OB  Cha Rasheed 27 y o  female MRN: [id number]  Unit/Bed#: LD Triage  Encounter: [id number]    Chief Complaint: sent from [facility] for further [facility] evaluation for short cervix  EGA: 23 0, MARY 3/16/18    HPI: Patient is a [id number] at 23 0wks (MARY 3/16/18 by 1st trimester ultrasound) who presented to [address] [place] [address] and delivery complaining of cramping and pelvic pressure  Patient reported that she was at work and lifted a moderately heavy box and felt a pop in her lower back  She reports pelvic cramping and pelvic pressure started after lifting boxes at work  She reports her pelvic cramping is not similar to contractions  She denies loss of fluid and vaginal bleeding  She reports good fetal movement  She was evaluated for  labor at [facility] labor and delivery unit  Her pelvic ultrasound was notable for shortened cervix and funneling  Ultrasound report noted a cervical length of 0 6 cm with a 1 7 cm funnel  She had been assessed multiple times during this pregnancy for similar complaints  Her last cervical length was noted on 2017 and was 3 3 cm  Vitals: Blood pressure 117/58, pulse 104, temperature 98 5 °F (36 9 °C), temperature source Oral, resp  rate 20, last menstrual period 06/15/2017  ,There is no height or weight on file to calculate BMI  Physical Exam  GEN: not in acute distress  SSE: deferred  SVE deferred    Abdominal ultrasound:  Notable for fetal over in vertex position, posterior placenta low lying in appearance  Transvaginal ultrasound:  Cervical length approximately 4 cm  Lower placental edge measured approximately 3 2-4 3 cm away from internal os of cervix  Placenta posterior      Labs:   Admission on 2017, Discharged on 2017   Component Date Value    ABO Grouping 2017 A     Rh Factor 2017 Positive     Antibody Screen 2017 Negative     Specimen Expiration Date 2017 [id number]     WBC 2017 12 19*    RBC 2017 4 57     Hemoglobin 2017 11 3*    Hematocrit 2017 35 3     MCV 2017 77*    MCH 2017 24 7*    MCHC 2017 32 0     RDW 2017 15 1     MPV 2017 10 4     Platelets 4311 266     Neutrophils Relative 2017 79*    Lymphocytes Relative 2017 15     Monocytes Relative 2017 6     Eosinophils Relative 2017 0     Basophils Relative 2017 0     Neutrophils Absolute 2017 9 61*    Lymphocytes Absolute 2017 1 81     Monocytes Absolute 2017 0 74     Eosinophils Absolute 2017 0 01     Basophils Absolute 2017 0 02     External Strep Group B Ag 2017 Positive*    External Strep Group B Ag 2017 Positive*    External Strep Group B Ag 2017 Positive*    External Strep Group B Ag 2017 Positive*       Lab, Imaging and other studies: I have personally reviewed pertinent reports  Assessment  30-year-old  012 at 23 0 weeks gestation with possible urinary tract infection  Concern for short cervix based on TVUS done at [address] [place] [address] and Delivery  Plan:   1  Short cervix  - repeat TVUS revealed cervical length approximately 4cm  - Vertex presentation of fetus  - low lying placenta visualized  Distance from presumed internal os from placental edge 3 24-4 32cm  2  Urinary tract infection  - debris noted in bladder on TVUS  - will send urine culture  - Rx provided for macrobid 100mg bid for 7 days, will follow up urine culture  Discharge instructions given to patient and labor precautions reviewed  Ramona Fraser MD  OBGYN, PGY3  2017 6:52 PM

## 2017-11-18 ENCOUNTER — HOSPITAL ENCOUNTER (OUTPATIENT)
Facility: HOSPITAL | Age: 27
Discharge: HOME | End: 2017-11-18
Attending: OBSTETRICS & GYNECOLOGY | Admitting: OBSTETRICS & GYNECOLOGY
Payer: COMMERCIAL

## 2017-11-18 DIAGNOSIS — Z3A.23 23 WEEKS GESTATION OF PREGNANCY: ICD-10-CM

## 2017-11-18 LAB — BACTERIA UR CULT: NORMAL

## 2017-11-18 PROCEDURE — 96372 THER/PROPH/DIAG INJ SC/IM: CPT

## 2017-11-18 RX ORDER — BETAMETHASONE SODIUM PHOSPHATE AND BETAMETHASONE ACETATE 3; 3 MG/ML; MG/ML
12 INJECTION, SUSPENSION INTRA-ARTICULAR; INTRALESIONAL; INTRAMUSCULAR; SOFT TISSUE ONCE
Status: COMPLETED | OUTPATIENT
Start: 2017-11-18 | End: 2017-11-18

## 2017-11-18 RX ORDER — BETAMETHASONE SODIUM PHOSPHATE AND BETAMETHASONE ACETATE 3; 3 MG/ML; MG/ML
12 INJECTION, SUSPENSION INTRA-ARTICULAR; INTRALESIONAL; INTRAMUSCULAR; SOFT TISSUE EVERY 24 HOURS
Status: DISCONTINUED | OUTPATIENT
Start: 2017-11-18 | End: 2017-11-18

## 2017-11-18 RX ADMIN — BETAMETHASONE SODIUM PHOSPHATE AND BETAMETHASONE ACETATE 12 MG: 3; 3 INJECTION, SUSPENSION INTRA-ARTICULAR; INTRALESIONAL; INTRAMUSCULAR at 15:32

## 2017-11-18 NOTE — OB NST/BPP/US
Camelia Adams, raymon [id number] at 23w0d with an MARY of 3/16/2018, by Ultrasound, was seen at [facility] for the following procedure(s): $Procedure Type: US - Transvaginal]                    Ultrasound Other  Fetal Presentation: Vertex  Cervical Length: 4 11  Funnel: No  Placenta Location: Posterior  Placenta Previa: No  Vasa Previa: No         Ramona Liriano MD  OBGYN, PGY3  11/17/2017 7:31 PM

## 2017-11-20 LAB — RPR SER QL: NORMAL

## 2017-11-21 ENCOUNTER — APPOINTMENT (OUTPATIENT)
Dept: URGENT CARE | Age: 27
End: 2017-11-21
Payer: OTHER MISCELLANEOUS

## 2017-11-21 PROCEDURE — 99213 OFFICE O/P EST LOW 20 MIN: CPT | Performed by: PREVENTIVE MEDICINE

## 2017-11-22 ENCOUNTER — GENERIC CONVERSION - ENCOUNTER (OUTPATIENT)
Dept: OTHER | Facility: OTHER | Age: 27
End: 2017-11-22

## 2017-12-01 ENCOUNTER — APPOINTMENT (OUTPATIENT)
Dept: URGENT CARE | Age: 27
End: 2017-12-01
Payer: OTHER MISCELLANEOUS

## 2017-12-01 PROCEDURE — 99214 OFFICE O/P EST MOD 30 MIN: CPT | Performed by: PREVENTIVE MEDICINE

## 2017-12-04 ENCOUNTER — APPOINTMENT (OUTPATIENT)
Dept: LAB | Facility: HOSPITAL | Age: 27
End: 2017-12-04
Payer: COMMERCIAL

## 2017-12-04 DIAGNOSIS — O14.90 PRE-ECLAMPSIA: ICD-10-CM

## 2017-12-04 LAB
ALBUMIN SERPL BCP-MCNC: 2.6 G/DL (ref 3.5–5)
ALP SERPL-CCNC: 72 U/L (ref 46–116)
ALT SERPL W P-5'-P-CCNC: 19 U/L (ref 12–78)
ANION GAP SERPL CALCULATED.3IONS-SCNC: 9 MMOL/L (ref 4–13)
AST SERPL W P-5'-P-CCNC: 19 U/L (ref 5–45)
BILIRUB SERPL-MCNC: 0.23 MG/DL (ref 0.2–1)
BUN SERPL-MCNC: 10 MG/DL (ref 5–25)
CALCIUM SERPL-MCNC: 9.1 MG/DL (ref 8.3–10.1)
CHLORIDE SERPL-SCNC: 105 MMOL/L (ref 100–108)
CO2 SERPL-SCNC: 25 MMOL/L (ref 21–32)
CREAT SERPL-MCNC: 0.4 MG/DL (ref 0.6–1.3)
CREAT UR-MCNC: 133 MG/DL
GFR SERPL CREATININE-BSD FRML MDRD: 146 ML/MIN/1.73SQ M
GLUCOSE SERPL-MCNC: 108 MG/DL (ref 65–140)
POTASSIUM SERPL-SCNC: 3.7 MMOL/L (ref 3.5–5.3)
PROT SERPL-MCNC: 6.9 G/DL (ref 6.4–8.2)
PROT UR-MCNC: 22 MG/DL
PROT/CREAT UR: 0.17 MG/G{CREAT} (ref 0–0.1)
SODIUM SERPL-SCNC: 139 MMOL/L (ref 136–145)

## 2017-12-04 PROCEDURE — 82570 ASSAY OF URINE CREATININE: CPT

## 2017-12-04 PROCEDURE — 36415 COLL VENOUS BLD VENIPUNCTURE: CPT

## 2017-12-04 PROCEDURE — 84156 ASSAY OF PROTEIN URINE: CPT

## 2017-12-04 PROCEDURE — 80053 COMPREHEN METABOLIC PANEL: CPT

## 2017-12-05 ENCOUNTER — GENERIC CONVERSION - ENCOUNTER (OUTPATIENT)
Dept: OTHER | Facility: OTHER | Age: 27
End: 2017-12-05

## 2017-12-05 DIAGNOSIS — R10.9 ABDOMINAL PAIN: ICD-10-CM

## 2017-12-05 DIAGNOSIS — Z34.82 ENCOUNTER FOR SUPERVISION OF OTHER NORMAL PREGNANCY, SECOND TRIMESTER: ICD-10-CM

## 2017-12-06 ENCOUNTER — HOSPITAL ENCOUNTER (OUTPATIENT)
Dept: ULTRASOUND IMAGING | Facility: HOSPITAL | Age: 27
Discharge: HOME | End: 2017-12-06
Payer: COMMERCIAL

## 2017-12-06 DIAGNOSIS — R10.9 ABDOMINAL PAIN: ICD-10-CM

## 2017-12-06 PROCEDURE — 76770 US EXAM ABDO BACK WALL COMP: CPT

## 2017-12-09 ENCOUNTER — GENERIC CONVERSION - ENCOUNTER (OUTPATIENT)
Dept: OTHER | Facility: OTHER | Age: 27
End: 2017-12-09

## 2017-12-11 ENCOUNTER — GENERIC CONVERSION - ENCOUNTER (OUTPATIENT)
Dept: OTHER | Facility: OTHER | Age: 27
End: 2017-12-11

## 2017-12-12 ENCOUNTER — APPOINTMENT (OUTPATIENT)
Dept: LAB | Facility: HOSPITAL | Age: 27
End: 2017-12-12
Payer: COMMERCIAL

## 2017-12-12 DIAGNOSIS — R10.9 ABDOMINAL PAIN: ICD-10-CM

## 2017-12-12 PROCEDURE — 87147 CULTURE TYPE IMMUNOLOGIC: CPT

## 2017-12-12 PROCEDURE — 87086 URINE CULTURE/COLONY COUNT: CPT

## 2017-12-15 LAB
BACTERIA UR CULT: ABNORMAL
BACTERIA UR CULT: ABNORMAL

## 2017-12-20 ENCOUNTER — APPOINTMENT (OUTPATIENT)
Dept: LAB | Facility: HOSPITAL | Age: 27
End: 2017-12-20
Payer: COMMERCIAL

## 2017-12-20 DIAGNOSIS — Z34.82 ENCOUNTER FOR SUPERVISION OF OTHER NORMAL PREGNANCY, SECOND TRIMESTER: ICD-10-CM

## 2017-12-20 LAB
ERYTHROCYTE [DISTWIDTH] IN BLOOD BY AUTOMATED COUNT: 16.3 % (ref 11.6–15.1)
GLUCOSE 1H P 50 G GLC PO SERPL-MCNC: 130 MG/DL
HCT VFR BLD AUTO: 33.6 % (ref 34.8–46.1)
HGB BLD-MCNC: 10.7 G/DL (ref 11.5–15.4)
MCH RBC QN AUTO: 24.4 PG (ref 26.8–34.3)
MCHC RBC AUTO-ENTMCNC: 31.8 G/DL (ref 31.4–37.4)
MCV RBC AUTO: 77 FL (ref 82–98)
PLATELET # BLD AUTO: 226 THOUSANDS/UL (ref 149–390)
PMV BLD AUTO: 10.2 FL (ref 8.9–12.7)
RBC # BLD AUTO: 4.38 MILLION/UL (ref 3.81–5.12)
WBC # BLD AUTO: 10.09 THOUSAND/UL (ref 4.31–10.16)

## 2017-12-20 PROCEDURE — 36415 COLL VENOUS BLD VENIPUNCTURE: CPT

## 2017-12-20 PROCEDURE — 82950 GLUCOSE TEST: CPT

## 2017-12-20 PROCEDURE — 85027 COMPLETE CBC AUTOMATED: CPT

## 2017-12-20 PROCEDURE — 86592 SYPHILIS TEST NON-TREP QUAL: CPT

## 2017-12-21 ENCOUNTER — ALLSCRIPTS OFFICE VISIT (OUTPATIENT)
Dept: OTHER | Facility: OTHER | Age: 27
End: 2017-12-21

## 2017-12-21 ENCOUNTER — GENERIC CONVERSION - ENCOUNTER (OUTPATIENT)
Dept: OTHER | Facility: OTHER | Age: 27
End: 2017-12-21

## 2017-12-21 LAB
GLUCOSE (HISTORICAL): NORMAL
PROT UR STRIP-MCNC: NORMAL MG/DL
RPR SER QL: NORMAL

## 2017-12-27 ENCOUNTER — APPOINTMENT (OUTPATIENT)
Dept: PERINATAL CARE | Facility: CLINIC | Age: 27
End: 2017-12-27
Payer: COMMERCIAL

## 2017-12-27 ENCOUNTER — GENERIC CONVERSION - ENCOUNTER (OUTPATIENT)
Dept: OTHER | Facility: OTHER | Age: 27
End: 2017-12-27

## 2017-12-27 PROCEDURE — 76817 TRANSVAGINAL US OBSTETRIC: CPT | Performed by: OBSTETRICS & GYNECOLOGY

## 2017-12-27 PROCEDURE — 76816 OB US FOLLOW-UP PER FETUS: CPT | Performed by: OBSTETRICS & GYNECOLOGY

## 2017-12-29 ENCOUNTER — GENERIC CONVERSION - ENCOUNTER (OUTPATIENT)
Dept: PERINATAL CARE | Facility: MEDICAL CENTER | Age: 27
End: 2017-12-29

## 2018-01-04 ENCOUNTER — GENERIC CONVERSION - ENCOUNTER (OUTPATIENT)
Dept: OTHER | Facility: OTHER | Age: 28
End: 2018-01-04

## 2018-01-11 NOTE — PROGRESS NOTES
OCT 30 2017         RE: Bk Maldonado                                 To: [address]   MR#: [id number]                                   [address] [place] [address]   : DEC 8 1990                                   [facility]  ENC: [id number]:ELIZABETH Reza, [facility]    (Exam #: [id number])                           Fax: [phone]      The LMP of this 27year old,  G4, P2-0-1-2 patient was unknown, her   working MARY is MAR 12 2018 and the current gestational age is 25 weeks 3   days by [address] [place] [address]  A sonographic examination was performed on OCT   30 2017 using real time equipment  The ultrasound examination was   performed using abdominal & vaginal techniques  The patient has a BMI of   39 4  Her blood pressure today was 102/69  Earliest US on record:  17  10w6d  MARY 3/16/18      Cardiac motion was observed at 158 bpm       INDICATIONS      fetal anatomical survey   morbid obesity      Exam Types      LEVEL II   Transvaginal      RESULTS      Fetus # 1 of 1   Variable presentation   Fetal growth appeared normal   Placenta Location = Low lying   No placenta previa   Placenta Grade = I      MEASUREMENTS (* Included In Average GA)      AC              15 6 cm        20 weeks 4 days* (52%)   BPD              4 4 cm        19 weeks 1 day * (19%)   HC              16 8 cm        19 weeks 2 days* (18%)   Femur            3 2 cm        20 weeks 2 days* (39%)      Nuchal Fold      3 4 mm   NBL              7 2 mm      Humerus          2 9 cm        19 weeks 2 days  (27%)      Cerebellum       2 0 cm        19 weeks 6 days   Biorbit          3 1 cm        20 weeks 1 day   CisternaMagna    3 8 mm      HC/AC           1 07   FL/AC           0 21   FL/BPD          0 75   EFW (Ac/Fl/Hc)   343 grams - 0 lbs 12 oz      THE AVERAGE GESTATIONAL AGE is 19 weeks 6 days +/- 10 days        AMNIOTIC FLUID         Largest Vertical Pocket = 3 5 cm CERVICAL EVALUATION      SUPINE      Cervical Length: 4 00 cm      OTHER TEST RESULTS           Funneling?: No             Dynamic Changes?: No        Resp  To TFP?: No      ANATOMY      Head                                    Normal   Face/Neck                               Normal   Th  Cav  Normal   Heart                                   See Details   Abd  Cav  Normal   Stomach                                 Normal   Right Kidney                            Normal   Left Kidney                             Normal   Bladder                                 Normal   Abd  Wall                               Normal   Spine                                   Normal   Extrems                                 Normal   Genitalia                               Normal   Placenta                                Normal   Umbl  Cord                              Normal   Uterus                                  Normal   PCI                                     Normal      ANATOMY DETAILS      Visualized Appearing Sonographically Normal:   HEAD: (Calvarium, BPD Level, Cavum, Lateral Ventricles, Choroid Plexus,   Cerebellum, Cisterna Magna);    FACE/NECK: (Neck, Nuchal Fold, Profile,   Orbits, Nose/Lips, Palate, Face);    TH  CAV  : (Lungs, Diaphragm); HEART: (Four Chamber View, Proximal Right Outflow, 3 Vessel Trachea, Short   Axis of Greater Vessels, Ductal Arch, Aortic Arch, IVC, SVC, Cardiac Axis,   Cardiac Position);    ABD  CAV : (Liver);    STOMACH, RIGHT KIDNEY, LEFT   KIDNEY, BLADDER, ABD  WALL, SPINE: (Cervical Spine, Thoracic Spine, Lumbar   Spine, Sacrum);    EXTREMS: (Lt Humerus, Rt Humerus, Lt Forearm, Rt   Forearm, Lt Hand, Rt Hand, Lt Femur, Rt Femur, Lt Low Leg, Rt Low Leg, Lt   Foot, Rt Foot);    GENITALIA (Male), PLACENTA, UMBL   CORD, UTERUS, PCI      Suboptimally Visualized:   HEART: (Proximal Left Outflow, 3VV, Interventricular Septum, Interatrial Septum)      ADNEXA      The left ovary appeared normal and measured 2 5 x 2 3 x 1 4 cm with a   volume of 4 2 cc  The right ovary appeared normal and measured 3 0 x 3 7 x   1 6 cm with a volume of 9 3 cc  IMPRESSION      Dominguez IUP   19 weeks and 6 days by this ultrasound  (MARY=MAR 20 2018)   20 weeks and 3 days by 1st Tri Sono  (MARY=MAR 16 2018)   Variable presentation   Fetal growth appeared normal   Regular fetal heart rate of 158 bpm   Low lying placenta   No placenta previa      CONSULT COMMENT      Thank you very much for your kind referral of Esvin Silva to the   [address] [place] [address] in [facility] on 2017 for level II ultrasound   evaluation and MFM consult  Layla Alvarado is a 62-year-old  female    4 para  who is currently at 20-3/7 weeks gestation by an   estimated due date of 2018 which is based upon first trimester   ultrasound dating  She presents for fetal anatomic evaluation today, with   consultation performed for the indication of obesity  Her prenatal course   so far has been unremarkable  Layla Alvarado has no complaints  She reports fetal   movement and denies vaginal bleeding  Early screening for gestational   diabetes in the first trimester revealed a normal one-hour post Glucola   value of 123 mg/dL  She has not yet received the influenza vaccine during   this pregnancy and did not have genetic screening performed  Layla Alvarado has a history of a prior  section at term in  in a   pregnancy complicated by preeclampsia  She had a successful  in 2013   at term following an uncomplicated prenatal course  Each of her babies was   appropriately grown at birth  Each of her children is currently healthy  She also has a history of one first trimester spontaneous pregnancy loss  Her current BMI is 39 4  She has a history of mild, intermittent asthma,   treated with an albuterol inhaler as needed   Her past medical and surgical   histories are otherwise unremarkable  She currently takes no medication   with the exception of a prenatal vitamin on a daily basis and has no known   drug allergy  She denies tobacco, alcohol, or illicit drug use during the   pregnancy  Her dad has hypertension  There is no first degree family   member with a diagnosis of diabetes  The family genetic history is   negative with respect to genetic abnormalities, birth defects, or mental   retardation  No fetal structural abnormality or ultrasound marker for aneuploidy is   identified on the Level II ultrasound study today  Imaging of the cardiac   septal, 3 vessel, and left ventricular outflow tract views is suboptimal   secondary to the constraints related to maternal obesity and unfavorable   fetal position  Fetal growth and amniotic fluid volume are normal   The   placenta is low-lying in location but is otherwise normal in appearance  The cervix is normal in appearance by transvaginal sonography  The   cervical length is normal   Cervical debris is not present  Cervical   funneling is not present  Neither provocative nor dynamic change is   appreciated  Today's ultrasound findings and suggested follow-up were discussed in   detail with Armando Kim  We discussed that prenatal ultrasound cannot rule out   all congenital abnormalities  Maternal obesity is associated with an   increased risk for adverse pregnancy outcomes, including gestational   diabetes, fetal growth abnormalities including macrosomia, fetal   structural abnormalities, preeclampsia, venous thromboembolism,   stillbirth, and increased likelihood for  section  Armando Kim will   return to the [facility]  at 28 weeks gestation to assess interval   growth, evaluate anatomic targets not imaged well today, and assess   placental location  Repeat fetal growth evaluation has been recommended in   the mid-to-late third trimester   Weekly non stress testing is recommended   for additional pregnancy surveillance beginning at 36 weeks gestation,   sooner if otherwise clinically indicated  Repeat screening for gestational   diabetes is recommended between 24 and 28 weeks gestation  Finally, we   discussed the importance of receiving the influenza vaccine during   pregnancy  The face to face time, in addition to time spent discussing ultrasound   results, was approximately 15 minutes, greater than 50% of which was spent   during counseling and coordination of care  ZHAO Martinez M D     Maternal-Fetal Medicine   Electronically signed 10/30/17 11:59

## 2018-01-12 NOTE — MISCELLANEOUS
Provider Comments  Provider Comments:   Patient is a no-show for her 58 new patient appointment today      Signatures   Electronically signed by : Meliton Matute, Enedina Hill; Feb 29 2016 10:39AM EST                       (Author)    Electronically signed by : JAY Garcia ; Feb 29 2016  2:35PM EST

## 2018-01-12 NOTE — RESULT NOTES
Verified Results  (1) PRENATAL PANEL [id number] 03:30PM Anne Dove    Order Number: [id number]     Test Name Result Flag Reference   BILIRUBIN UA Negative  Negative   CLARITY Slightly Cloudy     COLOR Yellow     KETONES UA Negative mg/dl  Negative   LEUKOCYTE ESTERASE UA Negative  Negative   NITRITE UA Negative  Negative   BLOOD UA Small A Negative, Trace-Intact   PH UA 6 0  4 5-8 0   PROTEIN UA Negative mg/dl  Negative   SPECIFIC GRAVITY UA 1 025  1 003-1 030   GLUCOSE UA Negative mg/dl  Negative   UROBILINOGEN UA 1 0 E U /dl  0 2, 1 0 E U /dl   HEPATITIS B SURFACE ANTIGEN Non-reactive  Non-reactive, NonReactive - Confirmed   BASOPHILS ABSOLUTE COUNT 0 02 Thousands/? ??L  0 00-0 10   EOSINOPHILS ABSOLUTE COUNT 0 02 Thousand/? ??L  0 00-0 61   LYMPHOCYTES ABSOLUTE COUNT 2 13 Thousands/? ??L  0 60-4 47   MONOCYTES ABSOLUTE COUNT 0 73 Thousand/? ??L  0 17-1 22   NEUTROPHILS ABSOLUTE COUNT 7 25 Thousands/? ??L  1 85-7 62   BASOPHILS RELATIVE PERCENT 0 %  0-1   EOSINOPHILS RELATIVE PERCENT 0 %  0-6   HEMATOCRIT 42 3 %  34 8-46 1   HEMOGLOBIN 14 1 g/dL  11 5-15 4   LYMPHOCYTES RELATIVE PERCENT 21 %  14-44   MCH 27 2 pg  26 8-34 3   MCHC 33 3 g/dL  31 4-37 4   MCV 82 fL  82-98   MONOCYTES RELATIVE PERCENT 7 %  4-12   nRBC AUTOMATED 0 /100 WBCs     PLATELET COUNT 933 Thousands/uL  149-390   MPV 10 1 fL  8 9-12 7   RBC COUNT 5 19 Million/uL H 3 81-5 12   RDW 15 8 % H 11 6-15 1   NEUTROPHILS RELATIVE PERCENT 72 %  43-75   WBC COUNT 10 15 Thousand/uL  4 31-10 16   RUBELLA (IGG) 9 6 IU/mL L >9 9   Rubella IgG       <5 0 IU/mL     Negative: not immune      5 0-9 9 IU/mL  Equivocal     >9 9 IU/mL     Positive: immune   ABO GROUPING A     RH FACTOR Positive     ANTIBODY SCREEN Negative     RPR Non-Reactive  Non-Reactive   CLINICAL REPORT (Report)     Test:        Urine culture  Specimen Type:   Urine  Specimen Date:   8/7/2017 3:30 PM  Result Date:    8/8/2017 5:25 PM  Result Status:   Final result  Resulting Lab:   [facility] [address] [facility] [address]            Tel: [phone]      CULTURE                                       ------------------                                   6844-8103 cfu/ml Beta Hemolytic Streptococcus Group B     *** This organism is intrinisically susceptible to Penicillin  If sensitivites to other antibiotics are required, please call the      Microbiology Department at [phone] within 5 days   ***    20,000-29,000 cfu/ml Mixed Contaminants X2   SPECIMEN EXPIRATION DATE [id number]

## 2018-01-13 NOTE — RESULT NOTES
Verified Results  (1) PRENATAL PANEL [id number] 03:30PM Wolfgang Peraza    Order Number: [id number]     Test Name Result Flag Reference   BILIRUBIN UA Negative  Negative   CLARITY Slightly Cloudy     COLOR Yellow     KETONES UA Negative mg/dl  Negative   LEUKOCYTE ESTERASE UA Negative  Negative   NITRITE UA Negative  Negative   BLOOD UA Small A Negative, Trace-Intact   PH UA 6 0  4 5-8 0   PROTEIN UA Negative mg/dl  Negative   SPECIFIC GRAVITY UA 1 025  1 003-1 030   GLUCOSE UA Negative mg/dl  Negative   UROBILINOGEN UA 1 0 E U /dl  0 2, 1 0 E U /dl   HEPATITIS B SURFACE ANTIGEN Non-reactive  Non-reactive, NonReactive - Confirmed   BASOPHILS ABSOLUTE COUNT 0 02 Thousands/? ??L  0 00-0 10   EOSINOPHILS ABSOLUTE COUNT 0 02 Thousand/? ??L  0 00-0 61   LYMPHOCYTES ABSOLUTE COUNT 2 13 Thousands/? ??L  0 60-4 47   MONOCYTES ABSOLUTE COUNT 0 73 Thousand/? ??L  0 17-1 22   NEUTROPHILS ABSOLUTE COUNT 7 25 Thousands/? ??L  1 85-7 62   BASOPHILS RELATIVE PERCENT 0 %  0-1   EOSINOPHILS RELATIVE PERCENT 0 %  0-6   HEMATOCRIT 42 3 %  34 8-46 1   HEMOGLOBIN 14 1 g/dL  11 5-15 4   LYMPHOCYTES RELATIVE PERCENT 21 %  14-44   MCH 27 2 pg  26 8-34 3   MCHC 33 3 g/dL  31 4-37 4   MCV 82 fL  82-98   MONOCYTES RELATIVE PERCENT 7 %  4-12   nRBC AUTOMATED 0 /100 WBCs     PLATELET COUNT 190 Thousands/uL  149-390   MPV 10 1 fL  8 9-12 7   RBC COUNT 5 19 Million/uL H 3 81-5 12   RDW 15 8 % H 11 6-15 1   NEUTROPHILS RELATIVE PERCENT 72 %  43-75   WBC COUNT 10 15 Thousand/uL  4 31-10 16   RUBELLA (IGG) 9 6 IU/mL L >9 9   Rubella IgG       <5 0 IU/mL     Negative: not immune      5 0-9 9 IU/mL  Equivocal     >9 9 IU/mL     Positive: immune   ABO GROUPING A     RH FACTOR Positive     ANTIBODY SCREEN Negative     RPR Non-Reactive  Non-Reactive   SPECIMEN EXPIRATION DATE [id number]

## 2018-01-14 VITALS
SYSTOLIC BLOOD PRESSURE: 102 MMHG | DIASTOLIC BLOOD PRESSURE: 69 MMHG | WEIGHT: 182 LBS | BODY MASS INDEX: 39.26 KG/M2 | HEIGHT: 57 IN

## 2018-01-14 VITALS
HEART RATE: 95 BPM | SYSTOLIC BLOOD PRESSURE: 123 MMHG | BODY MASS INDEX: 39.12 KG/M2 | WEIGHT: 177.6 LBS | DIASTOLIC BLOOD PRESSURE: 82 MMHG

## 2018-01-14 NOTE — CONSULTS
I had the pleasure of evaluating your patient, Emmanuelle Calderon  My full evaluation follows:      Chief Complaint  Here for ultrasound study      History of Present Illness  Please refer to the ultrasound report for additional information  Active Problems    1  Asthma (493 90) (J45 909)   2  Encounter for pregnancy related examination in first trimester (V22 1) (Z34 91)   3  Encounter for routine gynecological examination with Papanicolaou smear of cervix   (V72 31,V76 2) (Z01 419)   4  Encounter for supervision of other normal pregnancy in second trimester (V22 1)   (Z34 82)   5  GERD (gastroesophageal reflux disease) (530 81) (K21 9)   6  High risk sexual behavior (V69 2) (Z72 51)   7  Nausea and vomiting in pregnancy (643 90) (O21 9)   8  Preeclampsia (642 40) (O14 90)   9  Screen for STD (sexually transmitted disease) (V74 5) (Z11 3)    Past Medical History    · History of  Delivery (669 70)   · History of Contraception management (V25 9) (Z30 9)   · History of breast disorder (V13 89) (I20 160)    Surgical History    · History of  Section   · History of OB  Services Vaginal Delivery After Previous     Family History    · Family history of hypertension (V17 49) (Z82 49)   · Family history of Osteoporosis (V17 81)    · Family history of hypertension (V17 49) (Z82 49)    · Family history of Cancer   · Family history of Diabetes Mellitus (V18 0)   · Family history of Heart Disease (V17 49)    Social History    · Denied: History of Alcohol Use (History)   · Denied: History of Drug Use   · High risk sexual behavior (V69 2) (Z72 51)   · Never A Smoker   · Social alcohol use (Z78 9)    Current Meds   1  Famotidine 10 MG Oral Tablet; TAKE 1 TABLET EVERY 12 HOURS DAILY; Therapy: 94Ypr8735 to (Evaluate:2017)  Requested for: 26Gzs0425; Last   Rx:47Wov0940 Ordered   2  Ondansetron HCl - 4 MG Oral Tablet; 1 tablet every 6 hours as needed for nausea; Last   Rx:00Vdg8445 Ordered   3   PNV Prenatal Plus Multivitamin 27-1 MG Oral Tablet; Therapy: (([phone]) to Recorded   4  Ventolin  (90 Base) MCG/ACT Inhalation Aerosol Solution; INHALE 1 TO 2 PUFFS   EVERY 4 TO 6 HOURS AS NEEDED; Therapy: 91SDZ7856 to (Last Rx:19Oct2017)  Requested for: 19Oct2017 Ordered    Allergies    1  No Known Drug Allergies    2  No Known Environmental Allergies   3  No Known Food Allergies    Vitals   Recorded: 56VIQ7010 75:17FE   Systolic 651   Diastolic 69   Height 4 ft 9 in   Weight 182 lb    BMI Calculated 39 38   BSA Calculated 1 73   Pain Scale 0     Results/Data  Exam description: level II obstetrical ultrasound, transvaginal obstetrical ultrasound  Findings: Please refer to the ultrasound report for additional information  Discussion/Summary    Please refer to the ultrasound report for additional information  The patient was counseled regarding diagnostic results, instructions for management, prognosis, impressions  Thank you very much for allowing me to participate in the care of this patient  If you have any questions, please do not hesitate to contact me        Future Appointments    Signatures   Electronically signed by : JAY Kimball ; Oct 30 2017 12:01PM EST                       (Author)

## 2018-01-15 ENCOUNTER — GENERIC CONVERSION - ENCOUNTER (OUTPATIENT)
Dept: OTHER | Facility: OTHER | Age: 28
End: 2018-01-15

## 2018-01-17 NOTE — MISCELLANEOUS
Provider Comments  Provider Comments:   Patient is a no-show for her 89o new patient appointment today        Signatures   Electronically signed by : ARNOLDO Bullock; Feb 22 2016 10:25AM EST                       (Author)    Electronically signed by : JAY Flaherty ; Feb 22 2016  1:21PM EST

## 2018-01-18 ENCOUNTER — OB ABSTRACT (OUTPATIENT)
Dept: OBGYN CLINIC | Facility: CLINIC | Age: 28
End: 2018-01-18

## 2018-01-18 DIAGNOSIS — O09.899 RUBELLA NON-IMMUNE STATUS, ANTEPARTUM: ICD-10-CM

## 2018-01-18 DIAGNOSIS — Z28.39 RUBELLA NON-IMMUNE STATUS, ANTEPARTUM: ICD-10-CM

## 2018-01-18 DIAGNOSIS — Z98.891 HISTORY OF VAGINAL BIRTH AFTER CESAREAN: ICD-10-CM

## 2018-01-18 DIAGNOSIS — O09.292 HX OF PREECLAMPSIA, PRIOR PREGNANCY, CURRENTLY PREGNANT, SECOND TRIMESTER: ICD-10-CM

## 2018-01-18 DIAGNOSIS — O44.40 LOW-LYING PLACENTA: ICD-10-CM

## 2018-01-18 DIAGNOSIS — O99.820 GROUP B STREPTOCOCCUS CARRIER, ANTEPARTUM: ICD-10-CM

## 2018-01-18 DIAGNOSIS — Z3A.23 23 WEEKS GESTATION OF PREGNANCY: ICD-10-CM

## 2018-01-22 VITALS — WEIGHT: 179 LBS | SYSTOLIC BLOOD PRESSURE: 110 MMHG | DIASTOLIC BLOOD PRESSURE: 74 MMHG | BODY MASS INDEX: 40.13 KG/M2

## 2018-01-22 VITALS — WEIGHT: 175 LBS | SYSTOLIC BLOOD PRESSURE: 106 MMHG | DIASTOLIC BLOOD PRESSURE: 71 MMHG | BODY MASS INDEX: 38.54 KG/M2

## 2018-01-22 VITALS
WEIGHT: 180 LBS | HEART RATE: 102 BPM | SYSTOLIC BLOOD PRESSURE: 126 MMHG | DIASTOLIC BLOOD PRESSURE: 77 MMHG | BODY MASS INDEX: 40.35 KG/M2

## 2018-01-22 VITALS
HEIGHT: 57 IN | SYSTOLIC BLOOD PRESSURE: 117 MMHG | BODY MASS INDEX: 37.32 KG/M2 | HEART RATE: 88 BPM | DIASTOLIC BLOOD PRESSURE: 74 MMHG | WEIGHT: 173 LBS

## 2018-01-22 VITALS — SYSTOLIC BLOOD PRESSURE: 115 MMHG | DIASTOLIC BLOOD PRESSURE: 75 MMHG

## 2018-01-22 VITALS
WEIGHT: 182 LBS | HEIGHT: 57 IN | SYSTOLIC BLOOD PRESSURE: 106 MMHG | DIASTOLIC BLOOD PRESSURE: 61 MMHG | BODY MASS INDEX: 39.26 KG/M2

## 2018-01-22 VITALS — SYSTOLIC BLOOD PRESSURE: 138 MMHG | BODY MASS INDEX: 39.82 KG/M2 | WEIGHT: 180.8 LBS | DIASTOLIC BLOOD PRESSURE: 81 MMHG

## 2018-01-23 NOTE — MISCELLANEOUS
Provider Comments  Provider Comments:   PT NO SHOW FOR PN LETTER SENT      Signatures   Electronically signed by : Darwin Martinez, ; Isael 15 2018  2:27PM EST                       (Author)

## 2018-01-23 NOTE — MISCELLANEOUS
Message  spoke to patient on the phone  she had debris in her bladder on US imaging  she is to go to the lab and give a urine sample to rule out a UTI      Active Problems    1  Asthma (493 90) (J45 909)   2  Encounter for  screening for cervical length (V28 89) (Z36 86)   3  Encounter for pregnancy related examination in first trimester (V22 1) (Z34 91)   4  Encounter for routine gynecological examination with Papanicolaou smear of cervix   (V72 31,V76 2) (Z01 419)   5  Encounter for supervision of other normal pregnancy in second trimester (V22 1)   (Z34 82)   6  Flank pain, acute (789 09,338 19) (R10 9)   7  GERD (gastroesophageal reflux disease) (530 81) (K21 9)   8  High risk sexual behavior (V69 2) (Z72 51)   9  Low-lying placenta in second trimester (641 03) (O44 42)   10  Maternal obesity, antepartum, second trimester (649 13,278 00) (E23 582)   11  Nausea and vomiting in pregnancy (643 90) (O21 9)   12  Preeclampsia (642 40) (O14 90)   13  Screen for STD (sexually transmitted disease) (V74 5) (Z11 3)    Current Meds   1  Albuterol Sulfate (2 5 MG/3ML) 0 083% Inhalation Nebulization Solution; USE 1 UNIT   DOSE EVERY 4-6 HOURS AS NEEDED FOR WHEEZING ; Therapy: 90YVS7375 to (Last Rx:11Lvl1904)  Requested for: 45WDG0566 Ordered   2  Famotidine 10 MG Oral Tablet; TAKE 1 TABLET EVERY 12 HOURS DAILY; Therapy: 83Nnb6915 to (Evaluate:69Frf2800)  Requested for: 51Env4560; Last   Rx:57Diu3132 Ordered   3  Ondansetron HCl - 4 MG Oral Tablet; 1 tablet every 6 hours as needed for nausea; Last   Rx:82Idt7445 Ordered   4  PNV Prenatal Plus Multivitamin 27-1 MG Oral Tablet; Therapy: (Recorded:59Ont7633) to Recorded    Allergies    1  No Known Drug Allergies    2  No Known Environmental Allergies   3   No Known Food Allergies    Signatures   Electronically signed by : Teddy Oconnor RN; Dec 11 2017  1:07PM EST                       (Author)

## 2018-01-23 NOTE — MISCELLANEOUS
Provider Comments  Provider Comments:   PT NO SHOW FOR PN LETTER SENT      Signatures   Electronically signed by : Nicolasa Strickland, ; Jan 4 2018  2:04PM EST                       (Author)

## 2018-01-23 NOTE — MISCELLANEOUS
Message  Call patient at [phone] and left voice message  Received a call from radiology concerned about possible debris in the bladder that was concerning for infection  Given her symptoms of a Q back pain with urinary pressure, a urine culture was ordered  Left message for patient to call back to [facility]  on Monday morning to be informed of need for urine culture as well as to get lab slip in having performed  Plan  Flank pain, acute    · (1) URINE CULTURE; Source:Urine, Clean Catch; Status:Active;  Requested  for:42Utk6130;     Signatures   Electronically signed by : JAY Cotton ; Dec  9 2017  1:33PM EST                       (Author)

## 2018-01-23 NOTE — PROGRESS NOTES
DEC 27 2017         RE: Hermilo Max                                 To: [address]   MR#: [id number]                                   [address] [place] [address]   : DEC 8 1990                                   [facility]  ENC: [id number]                             [place], [address]   (Exam #: [id number])                           Fax: [phone]      The LMP of this 24year old,  G4, P2-0-1-2 patient was unknown, her   working MARY is MAR 12 2018 and the current gestational age is 35 weeks 5   days by  [facility] [place] [address]  A sonographic examination was performed on DEC   27 2017 using real time equipment  The ultrasound examination was   performed using abdominal & vaginal techniques  The patient has a BMI of   39 2  Her blood pressure today was 105/70  Earliest US on record:  17  10w6d  MARY 3/16/18      Cardiac motion was observed at 155 bpm       INDICATIONS      missed anatomy follow up   morbid obesity   placental location      Exam Types      Transvaginal      RESULTS      Fetus # 1 of 1   Vertex presentation   Fetal growth appeared normal   Placenta Location = Posterior, fundal   No placenta previa   Placenta Grade = I      MEASUREMENTS (* Included In Average GA)      AC              24 2 cm        28 weeks 3 days* (37%)   BPD              6 7 cm        26 weeks 6 days* (6%)   HC              25 6 cm        27 weeks 4 days* (9%)   Femur            5 2 cm        27 weeks 4 days* (15%)      Cerebellum       3 4 cm        30 weeks 0 days      HC/AC           1 06   FL/AC           0 21   FL/BPD          0 77   EFW (Ac/Fl/Hc)  1168 grams - 2 lbs 9 oz                 (33%)      THE AVERAGE GESTATIONAL AGE is 27 weeks 4 days +/- 14 days        AMNIOTIC FLUID      Q1: 2 0      Q2: 4 4      Q3: 4 8      Q4: 3 6   JESUS ALBERTO Total = 14 9 cm   Amniotic Fluid: Normal      CERVICAL EVALUATION      SUPINE      Cervical Length: 4 87 cm      ANATOMY COMMENTS The prior fetal anatomic survey was limited the area of the LVOT, septum,   3VV  These anatomic views were seen today as sonographically normal   within the inherent limitations of fetal ultrasound and the anatomic   survey is now complete  Follow up intracranial anatomy (calvarium, cavum,   lateral ventricles, and cerebellum), cardiac anatomy (4chamber, LVOT,   RVOT, and 3VV), diaphragm, stomach, kidneys, and bladder appear normal       IMPRESSION      Dominguez IUP   27 weeks and 4 days by this ultrasound  (MARY=MAR 24 2018)   28 weeks and 5 days by 1st Tri Sono  (MARY=MAR 16 2018)   Vertex presentation   Fetal growth appeared normal   Regular fetal heart rate of 155 bpm   Posterior, fundal placenta   No placenta previa      GENERAL COMMENT      On exam today Geovanna Andino appears well, in no acute distress, and denies any   complaints  Her abdomen is non-tender  She had a recent normal Glucola   of 130 mg/dL  The fetal anatomic survey is now complete  There is no sonographic   evidence of fetal abnormalities at this time  The remainder of the survey   was completed previously  There has been appropriate interval fetal   growth  Good fetal movement and tone are seen  The amniotic fluid volume   appears normal  The placenta is no longer low lying  The patient was   informed of todays findings and all of her questions were answered  The   limitations of ultrasound were reviewed with the patient, which she   accepts  Precautions and fetal kick counts were reviewed  We discussed follow-up in detail, and I recommend she return in 6 weeks   for a fetal growth evaluation for the indication of maternal obesity  Please note, in addition to the time spent discussing the results of the   ultrasound, approximately 10 minutes were spent in consultation with the   patient and coordination of care, with greater than 50% of the time spent   in direct face-to-face counseling        Thank you very much for allowing us to participate in the care of this   very nice patient  Should you have any questions, please do not hesitate   to contact our office  Portions of the record may have been created with voice recognition   software  Occasional wrong word or "sound a like" substitutions may have   occurred due to the inherent limitations of voice recognition software  Read the chart carefully and recognize, using context, where substitutions   have occurred  ZHAO Lerma M D     Electronically signed 12/27/17 16:16

## 2018-01-24 VITALS
HEIGHT: 57 IN | WEIGHT: 181 LBS | DIASTOLIC BLOOD PRESSURE: 70 MMHG | BODY MASS INDEX: 39.05 KG/M2 | SYSTOLIC BLOOD PRESSURE: 105 MMHG

## 2018-01-24 VITALS
WEIGHT: 177 LBS | HEART RATE: 88 BPM | SYSTOLIC BLOOD PRESSURE: 105 MMHG | DIASTOLIC BLOOD PRESSURE: 68 MMHG | HEIGHT: 57 IN | BODY MASS INDEX: 38.19 KG/M2

## 2018-01-25 ENCOUNTER — OFFICE VISIT (OUTPATIENT)
Dept: OBGYN CLINIC | Facility: CLINIC | Age: 28
End: 2018-01-25
Payer: COMMERCIAL

## 2018-01-25 VITALS
BODY MASS INDEX: 39.17 KG/M2 | HEART RATE: 88 BPM | DIASTOLIC BLOOD PRESSURE: 74 MMHG | WEIGHT: 181 LBS | SYSTOLIC BLOOD PRESSURE: 116 MMHG

## 2018-01-25 DIAGNOSIS — O09.292 HX OF PREECLAMPSIA, PRIOR PREGNANCY, CURRENTLY PREGNANT, SECOND TRIMESTER: ICD-10-CM

## 2018-01-25 DIAGNOSIS — Z3A.32 32 WEEKS GESTATION OF PREGNANCY: ICD-10-CM

## 2018-01-25 DIAGNOSIS — Z98.891 HISTORY OF VAGINAL BIRTH AFTER CESAREAN: ICD-10-CM

## 2018-01-25 DIAGNOSIS — R82.90 CLOUDY URINE: Primary | ICD-10-CM

## 2018-01-25 DIAGNOSIS — O09.899 RUBELLA NON-IMMUNE STATUS, ANTEPARTUM: ICD-10-CM

## 2018-01-25 DIAGNOSIS — O99.820 GROUP B STREPTOCOCCUS CARRIER, ANTEPARTUM: ICD-10-CM

## 2018-01-25 DIAGNOSIS — Z28.39 RUBELLA NON-IMMUNE STATUS, ANTEPARTUM: ICD-10-CM

## 2018-01-25 DIAGNOSIS — Z3A.23 23 WEEKS GESTATION OF PREGNANCY: ICD-10-CM

## 2018-01-25 DIAGNOSIS — O44.40 LOW-LYING PLACENTA: ICD-10-CM

## 2018-01-25 LAB
SL AMB  POCT GLUCOSE, UA: ABNORMAL
SL AMB LEUKOCYTE ESTERASE,UA: ABNORMAL
SL AMB POCT BILIRUBIN,UA: 8
SL AMB POCT BLOOD,UA: ABNORMAL
SL AMB POCT CLARITY,UA: CLEAR
SL AMB POCT COLOR,UA: YELLOW
SL AMB POCT KETONES,UA: ABNORMAL
SL AMB POCT NITRITE,UA: ABNORMAL
SL AMB POCT PH,UA: 7
SL AMB POCT SPECIFIC GRAVITY,UA: 1.01
SL AMB POCT TOTAL PROTEIN: ABNORMAL

## 2018-01-25 PROCEDURE — 87086 URINE CULTURE/COLONY COUNT: CPT | Performed by: OBSTETRICS & GYNECOLOGY

## 2018-01-25 PROCEDURE — 81002 URINALYSIS NONAUTO W/O SCOPE: CPT | Performed by: OBSTETRICS & GYNECOLOGY

## 2018-01-25 PROCEDURE — 99212 OFFICE O/P EST SF 10 MIN: CPT | Performed by: OBSTETRICS & GYNECOLOGY

## 2018-01-25 NOTE — PROGRESS NOTES
Denies vaginal bleeding, LOF, ctx  Has good fetal movement  Patient denies urinary sxs  Still complaining of back pain  Plan/Assessment: IUP @ 32 weeks  1  Routine prenatal care  2  Flank/back pain: urine culture sent today (clean catch)  Patient hasn't had time to get renal ultrasound, but will try to schedule asap  3   labor precaution and [address] reviewed    4  RTO in two weeks

## 2018-01-25 NOTE — LETTER
January 25, 2018     Patient: Pedro Raines   YOB: 1990   Date of Visit: 1/25/2018       To Whom it May Concern:    Hermilo Mansoor is under my professional care  She was seen in my office on 1/25/2018  If you have any questions or concerns, please don't hesitate to call           Sincerely,            Trey Koehler MD

## 2018-01-27 LAB — BACTERIA UR CULT: NORMAL

## 2018-02-08 ENCOUNTER — ROUTINE PRENATAL (OUTPATIENT)
Dept: OBGYN CLINIC | Facility: CLINIC | Age: 28
End: 2018-02-08
Payer: COMMERCIAL

## 2018-02-08 VITALS
BODY MASS INDEX: 39.08 KG/M2 | WEIGHT: 180.6 LBS | DIASTOLIC BLOOD PRESSURE: 69 MMHG | HEART RATE: 83 BPM | SYSTOLIC BLOOD PRESSURE: 112 MMHG

## 2018-02-08 DIAGNOSIS — O09.899 RUBELLA NON-IMMUNE STATUS, ANTEPARTUM: ICD-10-CM

## 2018-02-08 DIAGNOSIS — Z98.891 HISTORY OF VAGINAL BIRTH AFTER CESAREAN: ICD-10-CM

## 2018-02-08 DIAGNOSIS — Z3A.34 34 WEEKS GESTATION OF PREGNANCY: Primary | ICD-10-CM

## 2018-02-08 DIAGNOSIS — O09.292 HX OF PREECLAMPSIA, PRIOR PREGNANCY, CURRENTLY PREGNANT, SECOND TRIMESTER: ICD-10-CM

## 2018-02-08 DIAGNOSIS — Z28.39 RUBELLA NON-IMMUNE STATUS, ANTEPARTUM: ICD-10-CM

## 2018-02-08 DIAGNOSIS — O99.820 GROUP B STREPTOCOCCUS CARRIER, ANTEPARTUM: ICD-10-CM

## 2018-02-08 DIAGNOSIS — Z3A.32 32 WEEKS GESTATION OF PREGNANCY: ICD-10-CM

## 2018-02-08 LAB
SL AMB  POCT GLUCOSE, UA: ABNORMAL
SL AMB LEUKOCYTE ESTERASE,UA: ABNORMAL
SL AMB POCT ALBUMIN: ABNORMAL
SL AMB POCT KETONES,UA: ABNORMAL
SL AMB POCT NITRITE,UA: ABNORMAL

## 2018-02-08 PROCEDURE — 81002 URINALYSIS NONAUTO W/O SCOPE: CPT | Performed by: OBSTETRICS & GYNECOLOGY

## 2018-02-08 PROCEDURE — 90471 IMMUNIZATION ADMIN: CPT | Performed by: OBSTETRICS & GYNECOLOGY

## 2018-02-08 PROCEDURE — 99213 OFFICE O/P EST LOW 20 MIN: CPT | Performed by: OBSTETRICS & GYNECOLOGY

## 2018-02-08 PROCEDURE — 90715 TDAP VACCINE 7 YRS/> IM: CPT | Performed by: OBSTETRICS & GYNECOLOGY

## 2018-02-08 NOTE — PROGRESS NOTES
Renea Lincoln is a 24 y o  female being seen today for her obstetrical visit  She is at 34w6d gestation  Patient reports no complaints  Fetal movement: normal  Denies vaginal bleeding or LOF  Denies any headache, vision changes, or abdominal pain  Menstrual History:  OB History      Para Term  AB Living    4 2 2   1 2    SAB TAB Ectopic Multiple Live Births    1       2           Patient's last menstrual period was 2017 (exact date)  The following portions of the patient's history were reviewed and updated as appropriate: allergies, current medications, past family history, past medical history, past social history, past surgical history and problem list     Review of Systems  Pertinent items are noted in HPI  Objective     /69   Pulse 83   Wt 81 9 kg (180 lb 9 6 oz)   LMP 2017 (Exact Date) Comment: Two positive tests at home  BMI 39 08 kg/m²   FHT:  145 BPM   Uterine Size: size equals dates   Presentation: unsure        Assessment     Pregnancy 34 and 6/7 weeks     Plan    GBS in urine, will require tx in labor  Hx of preeclampsia in first pregnancy  -patient did not have preeclampsia in her second pregnancy  -BP wnl, urine protein to creatinine ratio wnl  Hx of 1 prior LTCS followed by a successful , plans  again for this pregnancy  Hx of precipitous delivery-labor precautions reviewed  TDAP given today, patient refuses flu  Follow up in 2 Weeks

## 2018-03-12 ENCOUNTER — ROUTINE PRENATAL (OUTPATIENT)
Dept: OBGYN CLINIC | Facility: CLINIC | Age: 28
End: 2018-03-12
Payer: COMMERCIAL

## 2018-03-12 VITALS
HEART RATE: 106 BPM | SYSTOLIC BLOOD PRESSURE: 135 MMHG | WEIGHT: 194 LBS | BODY MASS INDEX: 41.98 KG/M2 | DIASTOLIC BLOOD PRESSURE: 84 MMHG

## 2018-03-12 DIAGNOSIS — O99.820 GROUP B STREPTOCOCCUS CARRIER, ANTEPARTUM: ICD-10-CM

## 2018-03-12 DIAGNOSIS — Z98.891 HISTORY OF VAGINAL BIRTH AFTER CESAREAN: ICD-10-CM

## 2018-03-12 DIAGNOSIS — Z3A.34 34 WEEKS GESTATION OF PREGNANCY: ICD-10-CM

## 2018-03-12 DIAGNOSIS — O09.292 HX OF PREECLAMPSIA, PRIOR PREGNANCY, CURRENTLY PREGNANT, SECOND TRIMESTER: ICD-10-CM

## 2018-03-12 DIAGNOSIS — Z28.39 RUBELLA NON-IMMUNE STATUS, ANTEPARTUM: ICD-10-CM

## 2018-03-12 DIAGNOSIS — Z3A.39 39 WEEKS GESTATION OF PREGNANCY: ICD-10-CM

## 2018-03-12 DIAGNOSIS — O09.899 RUBELLA NON-IMMUNE STATUS, ANTEPARTUM: ICD-10-CM

## 2018-03-12 DIAGNOSIS — Z34.93 PRENATAL CARE IN THIRD TRIMESTER: Primary | ICD-10-CM

## 2018-03-12 PROCEDURE — 99213 OFFICE O/P EST LOW 20 MIN: CPT | Performed by: NURSE PRACTITIONER

## 2018-03-12 NOTE — PATIENT INSTRUCTIONS
Increase fluid intake, water to help with swelling in your hands and legs  Decrease salt intake and eat fresh food  Call with headache, visual changes or abdominal pain  Call with vaginal bleeding, loss of fluid, regular contractions, decreased fetal movement, other needs or concerns

## 2018-03-12 NOTE — PROGRESS NOTES
Assessment & Plan  24 y o  [id number] at 39w3d presenting for routine prenatal visit  Pt left without leaving a urine  Discussed patient with Dr William Tobin patient, discussed Hx of pre-eclampsia, BP on the high side of normal, no urine to evaluate and edema  At visit pt denies H/A, epigastric pain or visual changes  Offered pt to go to L&D for labs and further evaluation, she is unsure if she will have someone to watch her children she will call in the morning if she does not go to have labs ordered to evaluate for pre-eclampsia   Discussed missed appointments, pt states it was due to work and snow, reinforced importance  Discussed wt  Gain and relationship to edema, discussed increased water intake and decreased sodium intake     Problem List Items Addressed This Visit     Rubella non-immune status, antepartum    History of vaginal birth after     Group B Streptococcus carrier, antepartum    Hx of preeclampsia, prior pregnancy, currently pregnant, second trimester    RESOLVED: 34 weeks gestation of pregnancy    39 weeks gestation of pregnancy    Prenatal care in third trimester - Primary      Plan  Increase fluid intake, water to help with swelling in your hands and legs  Decrease salt intake and eat fresh food  Call with headache, visual changes or abdominal pain  Call with vaginal bleeding, loss of fluid, regular contractions, decreased fetal movement, other needs or concerns  To L&D for further evaluation, if unable to go call to have labs ordered in AM to further evaluate for pre-eclampsia  Pt verbalized understanding of all discussed  ____________________________________________________________  Subjective  She is without complaint  She denies contractions, loss of fluid, or vaginal bleeding  She reportls regular fetal movements       Objective  /84   Pulse (!) 106   Wt 88 kg (194 lb)   LMP 2017 (Exact Date) Comment: Two positive tests at home  BMI 41 98 kg/m²   FHR: 150    Patient's Active Problem List  Patient Active Problem List   Diagnosis    Asthma    Rubella non-immune status, antepartum    History of vaginal birth after     Group B Streptococcus carrier, antepartum    Hx of preeclampsia, prior pregnancy, currently pregnant, second trimester    39 weeks gestation of pregnancy    Prenatal care in third trimester

## 2018-03-13 DIAGNOSIS — R03.0 ELEVATED BP WITHOUT DIAGNOSIS OF HYPERTENSION: Primary | ICD-10-CM

## 2018-03-14 ENCOUNTER — HOSPITAL ENCOUNTER (INPATIENT)
Facility: HOSPITAL | Age: 28
LOS: 4 days | Discharge: HOME | End: 2018-03-18
Attending: OBSTETRICS & GYNECOLOGY | Admitting: OBSTETRICS & GYNECOLOGY
Payer: COMMERCIAL

## 2018-03-14 DIAGNOSIS — Z28.39 RUBELLA NON-IMMUNE STATUS, ANTEPARTUM: ICD-10-CM

## 2018-03-14 DIAGNOSIS — J45.909 ASTHMA: ICD-10-CM

## 2018-03-14 DIAGNOSIS — Z98.891 HISTORY OF VAGINAL BIRTH AFTER CESAREAN: ICD-10-CM

## 2018-03-14 DIAGNOSIS — Z3A.39 39 WEEKS GESTATION OF PREGNANCY: Primary | ICD-10-CM

## 2018-03-14 DIAGNOSIS — O09.292 HX OF PREECLAMPSIA, PRIOR PREGNANCY, CURRENTLY PREGNANT, SECOND TRIMESTER: ICD-10-CM

## 2018-03-14 DIAGNOSIS — Z98.891 STATUS POST REPEAT LOW TRANSVERSE CESAREAN SECTION: ICD-10-CM

## 2018-03-14 DIAGNOSIS — O09.899 RUBELLA NON-IMMUNE STATUS, ANTEPARTUM: ICD-10-CM

## 2018-03-14 DIAGNOSIS — O99.820 GROUP B STREPTOCOCCUS CARRIER, ANTEPARTUM: ICD-10-CM

## 2018-03-14 PROBLEM — O99.212 MATERNAL OBESITY, ANTEPARTUM, SECOND TRIMESTER: Status: ACTIVE | Noted: 2017-10-30

## 2018-03-14 LAB
ALBUMIN SERPL BCP-MCNC: 2.6 G/DL (ref 3.5–5)
ALP SERPL-CCNC: 268 U/L (ref 46–116)
ALT SERPL W P-5'-P-CCNC: 11 U/L (ref 12–78)
ANION GAP SERPL CALCULATED.3IONS-SCNC: 12 MMOL/L (ref 4–13)
AST SERPL W P-5'-P-CCNC: 18 U/L (ref 5–45)
BASOPHILS # BLD AUTO: 0.03 THOUSANDS/ΜL (ref 0–0.1)
BASOPHILS NFR BLD AUTO: 0 % (ref 0–1)
BILIRUB SERPL-MCNC: 0.49 MG/DL (ref 0.2–1)
BUN SERPL-MCNC: 10 MG/DL (ref 5–25)
CALCIUM SERPL-MCNC: 9.1 MG/DL (ref 8.3–10.1)
CHLORIDE SERPL-SCNC: 103 MMOL/L (ref 100–108)
CO2 SERPL-SCNC: 22 MMOL/L (ref 21–32)
CREAT SERPL-MCNC: 0.43 MG/DL (ref 0.6–1.3)
EOSINOPHIL # BLD AUTO: 0.07 THOUSAND/ΜL (ref 0–0.61)
EOSINOPHIL NFR BLD AUTO: 1 % (ref 0–6)
ERYTHROCYTE [DISTWIDTH] IN BLOOD BY AUTOMATED COUNT: 18.8 % (ref 11.6–15.1)
GFR SERPL CREATININE-BSD FRML MDRD: 163 ML/MIN/1.73SQ M
GLUCOSE SERPL-MCNC: 86 MG/DL (ref 65–140)
HCT VFR BLD AUTO: 31.2 % (ref 34.8–46.1)
HGB BLD-MCNC: 9.6 G/DL (ref 11.5–15.4)
LYMPHOCYTES # BLD AUTO: 2.37 THOUSANDS/ΜL (ref 0.6–4.47)
LYMPHOCYTES NFR BLD AUTO: 23 % (ref 14–44)
MCH RBC QN AUTO: 20.3 PG (ref 26.8–34.3)
MCHC RBC AUTO-ENTMCNC: 30.8 G/DL (ref 31.4–37.4)
MCV RBC AUTO: 66 FL (ref 82–98)
MONOCYTES # BLD AUTO: 0.94 THOUSAND/ΜL (ref 0.17–1.22)
MONOCYTES NFR BLD AUTO: 9 % (ref 4–12)
NEUTROPHILS # BLD AUTO: 6.72 THOUSANDS/ΜL (ref 1.85–7.62)
NEUTS SEG NFR BLD AUTO: 67 % (ref 43–75)
NRBC BLD AUTO-RTO: 0 /100 WBCS
PLATELET # BLD AUTO: 199 THOUSANDS/UL (ref 149–390)
PMV BLD AUTO: 10.6 FL (ref 8.9–12.7)
POTASSIUM SERPL-SCNC: 3.7 MMOL/L (ref 3.5–5.3)
PROT SERPL-MCNC: 6.7 G/DL (ref 6.4–8.2)
RBC # BLD AUTO: 4.72 MILLION/UL (ref 3.81–5.12)
SODIUM SERPL-SCNC: 137 MMOL/L (ref 136–145)
WBC # BLD AUTO: 10.13 THOUSAND/UL (ref 4.31–10.16)

## 2018-03-14 PROCEDURE — 86901 BLOOD TYPING SEROLOGIC RH(D): CPT | Performed by: STUDENT IN AN ORGANIZED HEALTH CARE EDUCATION/TRAINING PROGRAM

## 2018-03-14 PROCEDURE — 86900 BLOOD TYPING SEROLOGIC ABO: CPT | Performed by: STUDENT IN AN ORGANIZED HEALTH CARE EDUCATION/TRAINING PROGRAM

## 2018-03-14 PROCEDURE — 80053 COMPREHEN METABOLIC PANEL: CPT | Performed by: STUDENT IN AN ORGANIZED HEALTH CARE EDUCATION/TRAINING PROGRAM

## 2018-03-14 PROCEDURE — 86592 SYPHILIS TEST NON-TREP QUAL: CPT | Performed by: STUDENT IN AN ORGANIZED HEALTH CARE EDUCATION/TRAINING PROGRAM

## 2018-03-14 PROCEDURE — 86850 RBC ANTIBODY SCREEN: CPT | Performed by: STUDENT IN AN ORGANIZED HEALTH CARE EDUCATION/TRAINING PROGRAM

## 2018-03-14 PROCEDURE — 85025 COMPLETE CBC W/AUTO DIFF WBC: CPT | Performed by: STUDENT IN AN ORGANIZED HEALTH CARE EDUCATION/TRAINING PROGRAM

## 2018-03-14 RX ORDER — ONDANSETRON 2 MG/ML
4 INJECTION INTRAMUSCULAR; INTRAVENOUS EVERY 6 HOURS PRN
Status: DISCONTINUED | OUTPATIENT
Start: 2018-03-14 | End: 2018-03-15

## 2018-03-14 RX ORDER — SODIUM CHLORIDE, SODIUM LACTATE, POTASSIUM CHLORIDE, CALCIUM CHLORIDE 600; 310; 30; 20 MG/100ML; MG/100ML; MG/100ML; MG/100ML
125 INJECTION, SOLUTION INTRAVENOUS CONTINUOUS
Status: DISCONTINUED | OUTPATIENT
Start: 2018-03-14 | End: 2018-03-18 | Stop reason: HOSPADM

## 2018-03-14 RX ORDER — ALBUTEROL SULFATE 90 UG/1
2 AEROSOL, METERED RESPIRATORY (INHALATION) EVERY 6 HOURS PRN
Status: DISCONTINUED | OUTPATIENT
Start: 2018-03-14 | End: 2018-03-18 | Stop reason: HOSPADM

## 2018-03-14 RX ADMIN — SODIUM CHLORIDE, SODIUM LACTATE, POTASSIUM CHLORIDE, AND CALCIUM CHLORIDE 125 ML/HR: .6; .31; .03; .02 INJECTION, SOLUTION INTRAVENOUS at 23:35

## 2018-03-14 RX ADMIN — SODIUM CHLORIDE 5 MILLION UNITS: 0.9 INJECTION, SOLUTION INTRAVENOUS at 23:37

## 2018-03-15 ENCOUNTER — ANESTHESIA EVENT (INPATIENT)
Dept: LABOR AND DELIVERY | Facility: HOSPITAL | Age: 28
End: 2018-03-15
Payer: COMMERCIAL

## 2018-03-15 ENCOUNTER — ANESTHESIA (INPATIENT)
Dept: LABOR AND DELIVERY | Facility: HOSPITAL | Age: 28
End: 2018-03-15
Payer: COMMERCIAL

## 2018-03-15 LAB
ABO GROUP BLD: NORMAL
BACTERIA UR QL AUTO: ABNORMAL /HPF
BASOPHILS # BLD AUTO: 0.02 THOUSANDS/ΜL (ref 0–0.1)
BASOPHILS NFR BLD AUTO: 0 % (ref 0–1)
BILIRUB UR QL STRIP: NEGATIVE
BLD GP AB SCN SERPL QL: NEGATIVE
CLARITY UR: CLEAR
COLOR UR: YELLOW
CREAT UR-MCNC: 113 MG/DL
EOSINOPHIL # BLD AUTO: 0.01 THOUSAND/ΜL (ref 0–0.61)
EOSINOPHIL NFR BLD AUTO: 0 % (ref 0–6)
ERYTHROCYTE [DISTWIDTH] IN BLOOD BY AUTOMATED COUNT: 18.9 % (ref 11.6–15.1)
GLUCOSE UR STRIP-MCNC: NEGATIVE MG/DL
HCT VFR BLD AUTO: 29 % (ref 34.8–46.1)
HGB BLD-MCNC: 8.8 G/DL (ref 11.5–15.4)
HGB UR QL STRIP.AUTO: ABNORMAL
KETONES UR STRIP-MCNC: ABNORMAL MG/DL
LEUKOCYTE ESTERASE UR QL STRIP: NEGATIVE
LYMPHOCYTES # BLD AUTO: 1.53 THOUSANDS/ΜL (ref 0.6–4.47)
LYMPHOCYTES NFR BLD AUTO: 10 % (ref 14–44)
MCH RBC QN AUTO: 20.1 PG (ref 26.8–34.3)
MCHC RBC AUTO-ENTMCNC: 30.3 G/DL (ref 31.4–37.4)
MCV RBC AUTO: 66 FL (ref 82–98)
MONOCYTES # BLD AUTO: 1.21 THOUSAND/ΜL (ref 0.17–1.22)
MONOCYTES NFR BLD AUTO: 8 % (ref 4–12)
MUCOUS THREADS UR QL AUTO: ABNORMAL
NEUTROPHILS # BLD AUTO: 12.87 THOUSANDS/ΜL (ref 1.85–7.62)
NEUTS SEG NFR BLD AUTO: 82 % (ref 43–75)
NITRITE UR QL STRIP: NEGATIVE
NON-SQ EPI CELLS URNS QL MICRO: ABNORMAL /HPF
NRBC BLD AUTO-RTO: 0 /100 WBCS
PH UR STRIP.AUTO: 6 [PH] (ref 4.5–8)
PLATELET # BLD AUTO: 173 THOUSANDS/UL (ref 149–390)
PMV BLD AUTO: 10.2 FL (ref 8.9–12.7)
PROT UR STRIP-MCNC: NEGATIVE MG/DL
PROT UR-MCNC: 42 MG/DL
PROT/CREAT UR: 0.37 MG/G{CREAT} (ref 0–0.1)
RBC # BLD AUTO: 4.38 MILLION/UL (ref 3.81–5.12)
RBC #/AREA URNS AUTO: ABNORMAL /HPF
RH BLD: POSITIVE
RPR SER QL: NORMAL
SP GR UR STRIP.AUTO: 1.02 (ref 1–1.03)
SPECIMEN EXPIRATION DATE: NORMAL
UROBILINOGEN UR QL STRIP.AUTO: 1 E.U./DL
WBC # BLD AUTO: 15.64 THOUSAND/UL (ref 4.31–10.16)
WBC #/AREA URNS AUTO: ABNORMAL /HPF

## 2018-03-15 PROCEDURE — 59514 CESAREAN DELIVERY ONLY: CPT | Performed by: OBSTETRICS & GYNECOLOGY

## 2018-03-15 PROCEDURE — 4A1HXCZ MONITORING OF PRODUCTS OF CONCEPTION, CARDIAC RATE, EXTERNAL APPROACH: ICD-10-PCS | Performed by: OBSTETRICS & GYNECOLOGY

## 2018-03-15 PROCEDURE — 82570 ASSAY OF URINE CREATININE: CPT | Performed by: STUDENT IN AN ORGANIZED HEALTH CARE EDUCATION/TRAINING PROGRAM

## 2018-03-15 PROCEDURE — 84156 ASSAY OF PROTEIN URINE: CPT | Performed by: STUDENT IN AN ORGANIZED HEALTH CARE EDUCATION/TRAINING PROGRAM

## 2018-03-15 PROCEDURE — 10H07YZ INSERTION OF OTHER DEVICE INTO PRODUCTS OF CONCEPTION, VIA NATURAL OR ARTIFICIAL OPENING: ICD-10-PCS | Performed by: OBSTETRICS & GYNECOLOGY

## 2018-03-15 PROCEDURE — 85025 COMPLETE CBC W/AUTO DIFF WBC: CPT | Performed by: OBSTETRICS & GYNECOLOGY

## 2018-03-15 PROCEDURE — 99212 OFFICE O/P EST SF 10 MIN: CPT

## 2018-03-15 PROCEDURE — 81001 URINALYSIS AUTO W/SCOPE: CPT | Performed by: STUDENT IN AN ORGANIZED HEALTH CARE EDUCATION/TRAINING PROGRAM

## 2018-03-15 RX ORDER — OXYTOCIN/RINGER'S LACTATE 30/500 ML
1-30 PLASTIC BAG, INJECTION (ML) INTRAVENOUS
Status: DISCONTINUED | OUTPATIENT
Start: 2018-03-15 | End: 2018-03-15

## 2018-03-15 RX ORDER — NALBUPHINE HCL 10 MG/ML
5 AMPUL (ML) INJECTION
Status: ACTIVE | OUTPATIENT
Start: 2018-03-15 | End: 2018-03-16

## 2018-03-15 RX ORDER — MORPHINE SULFATE 0.5 MG/ML
INJECTION, SOLUTION EPIDURAL; INTRATHECAL; INTRAVENOUS
Status: COMPLETED
Start: 2018-03-15 | End: 2018-03-15

## 2018-03-15 RX ORDER — BUPIVACAINE HYDROCHLORIDE 2.5 MG/ML
INJECTION, SOLUTION INFILTRATION; PERINEURAL AS NEEDED
Status: DISCONTINUED | OUTPATIENT
Start: 2018-03-15 | End: 2018-03-15 | Stop reason: SURG

## 2018-03-15 RX ORDER — NALOXONE HYDROCHLORIDE 0.4 MG/ML
0.1 INJECTION, SOLUTION INTRAMUSCULAR; INTRAVENOUS; SUBCUTANEOUS
Status: ACTIVE | OUTPATIENT
Start: 2018-03-15 | End: 2018-03-16

## 2018-03-15 RX ORDER — ONDANSETRON 2 MG/ML
4 INJECTION INTRAMUSCULAR; INTRAVENOUS ONCE AS NEEDED
Status: DISCONTINUED | OUTPATIENT
Start: 2018-03-15 | End: 2018-03-15

## 2018-03-15 RX ORDER — ACETAMINOPHEN 325 MG/1
650 TABLET ORAL EVERY 6 HOURS PRN
Status: DISCONTINUED | OUTPATIENT
Start: 2018-03-15 | End: 2018-03-18 | Stop reason: HOSPADM

## 2018-03-15 RX ORDER — BUTORPHANOL TARTRATE 1 MG/ML
2 INJECTION, SOLUTION INTRAMUSCULAR; INTRAVENOUS ONCE
Status: COMPLETED | OUTPATIENT
Start: 2018-03-15 | End: 2018-03-15

## 2018-03-15 RX ORDER — FENTANYL CITRATE/PF 50 MCG/ML
25 SYRINGE (ML) INJECTION
Status: DISCONTINUED | OUTPATIENT
Start: 2018-03-15 | End: 2018-03-15

## 2018-03-15 RX ORDER — OXYCODONE HYDROCHLORIDE AND ACETAMINOPHEN 5; 325 MG/1; MG/1
1 TABLET ORAL EVERY 4 HOURS PRN
Status: DISPENSED | OUTPATIENT
Start: 2018-03-15 | End: 2018-03-16

## 2018-03-15 RX ORDER — SIMETHICONE 80 MG
80 TABLET,CHEWABLE ORAL 4 TIMES DAILY PRN
Status: DISCONTINUED | OUTPATIENT
Start: 2018-03-15 | End: 2018-03-18 | Stop reason: HOSPADM

## 2018-03-15 RX ORDER — DIAPER,BRIEF,INFANT-TODD,DISP
1 EACH MISCELLANEOUS DAILY PRN
Status: DISCONTINUED | OUTPATIENT
Start: 2018-03-15 | End: 2018-03-18 | Stop reason: HOSPADM

## 2018-03-15 RX ORDER — ONDANSETRON 2 MG/ML
4 INJECTION INTRAMUSCULAR; INTRAVENOUS EVERY 4 HOURS PRN
Status: DISPENSED | OUTPATIENT
Start: 2018-03-15 | End: 2018-03-16

## 2018-03-15 RX ORDER — OXYCODONE HYDROCHLORIDE AND ACETAMINOPHEN 5; 325 MG/1; MG/1
1 TABLET ORAL EVERY 4 HOURS PRN
Status: DISCONTINUED | OUTPATIENT
Start: 2018-03-16 | End: 2018-03-18 | Stop reason: HOSPADM

## 2018-03-15 RX ORDER — CALCIUM CARBONATE 200(500)MG
1000 TABLET,CHEWABLE ORAL DAILY PRN
Status: DISCONTINUED | OUTPATIENT
Start: 2018-03-15 | End: 2018-03-18 | Stop reason: HOSPADM

## 2018-03-15 RX ORDER — ONDANSETRON 2 MG/ML
4 INJECTION INTRAMUSCULAR; INTRAVENOUS EVERY 8 HOURS PRN
Status: DISCONTINUED | OUTPATIENT
Start: 2018-03-16 | End: 2018-03-18 | Stop reason: HOSPADM

## 2018-03-15 RX ORDER — OXYTOCIN/RINGER'S LACTATE 30/500 ML
62.5 PLASTIC BAG, INJECTION (ML) INTRAVENOUS CONTINUOUS
Status: DISCONTINUED | OUTPATIENT
Start: 2018-03-15 | End: 2018-03-18 | Stop reason: HOSPADM

## 2018-03-15 RX ORDER — IBUPROFEN 600 MG/1
600 TABLET ORAL EVERY 6 HOURS PRN
Status: DISCONTINUED | OUTPATIENT
Start: 2018-03-16 | End: 2018-03-18 | Stop reason: HOSPADM

## 2018-03-15 RX ORDER — ONDANSETRON 2 MG/ML
INJECTION INTRAMUSCULAR; INTRAVENOUS AS NEEDED
Status: DISCONTINUED | OUTPATIENT
Start: 2018-03-15 | End: 2018-03-15 | Stop reason: SURG

## 2018-03-15 RX ORDER — SODIUM CHLORIDE 9 MG/ML
INJECTION, SOLUTION INTRAVENOUS CONTINUOUS PRN
Status: DISCONTINUED | OUTPATIENT
Start: 2018-03-15 | End: 2018-03-15 | Stop reason: SURG

## 2018-03-15 RX ORDER — DIPHENHYDRAMINE HYDROCHLORIDE 50 MG/ML
25 INJECTION INTRAMUSCULAR; INTRAVENOUS EVERY 6 HOURS PRN
Status: DISCONTINUED | OUTPATIENT
Start: 2018-03-15 | End: 2018-03-18 | Stop reason: HOSPADM

## 2018-03-15 RX ORDER — OXYCODONE HYDROCHLORIDE AND ACETAMINOPHEN 5; 325 MG/1; MG/1
2 TABLET ORAL EVERY 4 HOURS PRN
Status: DISCONTINUED | OUTPATIENT
Start: 2018-03-16 | End: 2018-03-18 | Stop reason: HOSPADM

## 2018-03-15 RX ORDER — DOCUSATE SODIUM 100 MG/1
100 CAPSULE, LIQUID FILLED ORAL 2 TIMES DAILY
Status: DISCONTINUED | OUTPATIENT
Start: 2018-03-15 | End: 2018-03-18 | Stop reason: HOSPADM

## 2018-03-15 RX ORDER — LIDOCAINE HYDROCHLORIDE AND EPINEPHRINE 20; 5 MG/ML; UG/ML
INJECTION, SOLUTION EPIDURAL; INFILTRATION; INTRACAUDAL; PERINEURAL AS NEEDED
Status: DISCONTINUED | OUTPATIENT
Start: 2018-03-15 | End: 2018-03-15 | Stop reason: SURG

## 2018-03-15 RX ORDER — MORPHINE SULFATE 0.5 MG/ML
INJECTION, SOLUTION EPIDURAL; INTRATHECAL; INTRAVENOUS AS NEEDED
Status: DISCONTINUED | OUTPATIENT
Start: 2018-03-15 | End: 2018-03-15 | Stop reason: SURG

## 2018-03-15 RX ADMIN — CEFAZOLIN SODIUM 2000 MG: 1 SOLUTION INTRAVENOUS at 14:21

## 2018-03-15 RX ADMIN — LIDOCAINE HYDROCHLORIDE AND EPINEPHRINE 5 ML: 20; 5 INJECTION, SOLUTION EPIDURAL; INFILTRATION; INTRACAUDAL; PERINEURAL at 13:54

## 2018-03-15 RX ADMIN — MORPHINE SULFATE 2.5 MG: 0.5 INJECTION, SOLUTION EPIDURAL; INTRATHECAL; INTRAVENOUS at 15:35

## 2018-03-15 RX ADMIN — BUTORPHANOL TARTRATE 2 MG: 1 INJECTION, SOLUTION INTRAMUSCULAR; INTRAVENOUS at 10:01

## 2018-03-15 RX ADMIN — SODIUM CHLORIDE 2.5 MILLION UNITS: 9 INJECTION, SOLUTION INTRAVENOUS at 11:27

## 2018-03-15 RX ADMIN — SODIUM CHLORIDE 2.5 MILLION UNITS: 9 INJECTION, SOLUTION INTRAVENOUS at 07:29

## 2018-03-15 RX ADMIN — SODIUM CHLORIDE, SODIUM LACTATE, POTASSIUM CHLORIDE, AND CALCIUM CHLORIDE 125 ML/HR: .6; .31; .03; .02 INJECTION, SOLUTION INTRAVENOUS at 12:20

## 2018-03-15 RX ADMIN — SODIUM CHLORIDE 2.5 MILLION UNITS: 9 INJECTION, SOLUTION INTRAVENOUS at 03:31

## 2018-03-15 RX ADMIN — LIDOCAINE HYDROCHLORIDE AND EPINEPHRINE 5 ML: 20; 5 INJECTION, SOLUTION EPIDURAL; INFILTRATION; INTRACAUDAL; PERINEURAL at 14:09

## 2018-03-15 RX ADMIN — ONDANSETRON HYDROCHLORIDE 4 MG: 2 INJECTION, SOLUTION INTRAVENOUS at 14:22

## 2018-03-15 RX ADMIN — LIDOCAINE HYDROCHLORIDE AND EPINEPHRINE 5 ML: 20; 5 INJECTION, SOLUTION EPIDURAL; INFILTRATION; INTRACAUDAL; PERINEURAL at 14:18

## 2018-03-15 RX ADMIN — AZITHROMYCIN 500 MG: 500 INJECTION, POWDER, LYOPHILIZED, FOR SOLUTION INTRAVENOUS at 14:34

## 2018-03-15 RX ADMIN — ACETAMINOPHEN 650 MG: 325 TABLET, FILM COATED ORAL at 05:34

## 2018-03-15 RX ADMIN — BUPIVACAINE HYDROCHLORIDE 5 ML: 2.5 INJECTION, SOLUTION INFILTRATION; PERINEURAL at 11:53

## 2018-03-15 RX ADMIN — BUTORPHANOL TARTRATE 2 MG: 1 INJECTION, SOLUTION INTRAMUSCULAR; INTRAVENOUS at 00:47

## 2018-03-15 RX ADMIN — ROPIVACAINE HYDROCHLORIDE: 2 INJECTION, SOLUTION EPIDURAL; INFILTRATION at 12:03

## 2018-03-15 RX ADMIN — OXYTOCIN 250 MILLI-UNITS/MIN: 10 INJECTION, SOLUTION INTRAMUSCULAR; INTRAVENOUS at 15:10

## 2018-03-15 RX ADMIN — ONDANSETRON 4 MG: 2 INJECTION INTRAMUSCULAR; INTRAVENOUS at 19:52

## 2018-03-15 RX ADMIN — HYDROMORPHONE HYDROCHLORIDE 0.5 MG: 1 INJECTION, SOLUTION INTRAMUSCULAR; INTRAVENOUS; SUBCUTANEOUS at 17:07

## 2018-03-15 RX ADMIN — OXYTOCIN 250 MILLI-UNITS/MIN: 10 INJECTION, SOLUTION INTRAMUSCULAR; INTRAVENOUS at 14:39

## 2018-03-15 RX ADMIN — SODIUM CHLORIDE: 0.9 INJECTION, SOLUTION INTRAVENOUS at 14:59

## 2018-03-15 RX ADMIN — SODIUM CHLORIDE, SODIUM LACTATE, POTASSIUM CHLORIDE, AND CALCIUM CHLORIDE 125 ML/HR: .6; .31; .03; .02 INJECTION, SOLUTION INTRAVENOUS at 05:12

## 2018-03-15 RX ADMIN — BUPIVACAINE HYDROCHLORIDE 5 ML: 2.5 INJECTION, SOLUTION INFILTRATION; PERINEURAL at 11:51

## 2018-03-15 RX ADMIN — Medication 2 MILLI-UNITS/MIN: at 07:30

## 2018-03-15 NOTE — OB LABOR/OXYTOCIN SAFETY PROGRESS
Labor Progress Note - Luis Single 24 y o  female MRN: [id number]    Unit/Bed#: L&D 324-01 Encounter: [id number]    Obstetric History       T2      L2     SAB1   TAB0   Ectopic0   Multiple0   Live Births2      Gestational Age: 37w11d     Contraction Frequency (minutes): 5-6  Contraction Quality: Moderate  Tachysystole: No   Dilation: 4-5        Effacement (%): 90  Station: -1  Baseline Rate: 125 bpm     FHR Category: Category I    Notes/comments:   Patient more uncomfortable  Will give stadol 2 mg for labor pain  Cervical check unchanged  Will start pitocin titration per protocol and continue to monitor      D/w Dr Fiona Kirkland MD 3/15/2018 6:59 AM

## 2018-03-15 NOTE — OB LABOR/OXYTOCIN SAFETY PROGRESS
Labor Progress Note - Lynn Fuller 24 y o  female MRN: [id number]    Unit/Bed#: L&D 324-01 Encounter: [id number]    Obstetric History       T2      L2     SAB1   TAB0   Ectopic0   Multiple0   Live Births2      Gestational Age: 37w11d     Contraction Frequency (minutes): 2-4  Contraction Quality: Mild  Tachysystole: No   Dilation: 2        Effacement (%): 80  Station: -2  Baseline Rate: 150 bpm     FHR Category: Category I     Notes/comments:     Patient feeling more  uncomfortable  and requesting pain medication   fetal heart tracing  Category 1  Baseline notably 150s with moderate to marked good variability at times or prolonged accelerations-  Unclear at this time  The patient is afebrile and not tachycardic  Will give 2 mg of Stadol and continue to manage expectantly at this time       Discussed with Dr Maliha Kamara MD 3/15/2018 12:38 AM

## 2018-03-15 NOTE — OB LABOR/OXYTOCIN SAFETY PROGRESS
Oxytocin Safety Progress Check Note - Lucila Colbert 24 y o  female MRN: [id number]    Unit/Bed#: L&D 324-01 Encounter: [id number]    Obstetric History       T2      L2     SAB1   TAB0   Ectopic0   Multiple0   Live Births2          Gestational Age: 37w11d  Dose (soraida-units/min) Oxytocin: 10 soraida-units/min  Contraction Frequency (minutes): 3-5  Contraction Quality: Moderate  Tachysystole: No   Dilation: 4-5        Effacement (%): 90  Station: -1  Baseline Rate: 125 bpm     FHR Category: Category I          Notes/comments:            Patient examined /3 anterior   There was noted to be a large forebag and was ruptured with placement of the IUPC  With antiflexed cervix  Pt was counseled in great detail the need for the IUPC and the reason that she would need it  Advised that she has been unchanged since 345 am and that we can not determine if she has and arrest of dilation or if she is not adequately contacting     Pt advised that her options for today at this time are  1  Nothing continue to stay on the pitocin at 10 bc I will not increase the pitocin with being unable to monitor the contractions  2  IUPC which will let us know that the contractions are not regular or week and that we will need to increase the pitocin until adequate  3  Proceed with a repeat C section     Pt was advised that all are exceptable  options for her and that she can make her decision  Pt and  were at bedside and given a chance to answer questions     Pt decided to try the IUPC  Pt was offered an epidural and advised that it will help her with relaxation she is considering the epidural     Close observation to eli Ruiz MD 3/15/2018 10:46 AM

## 2018-03-15 NOTE — ANESTHESIA PREPROCEDURE EVALUATION
Review of Systems/Medical History          Cardiovascular  Negative cardio ROS    Pulmonary  Asthma: ,        GI/Hepatic    GERD ,             Endo/Other  Negative endo/other ROS   Obesity  morbid obesity   GYN  Currently pregnant (prior c section) ,          Hematology  Negative hematology ROS      Musculoskeletal  Negative musculoskeletal ROS        Neurology  Negative neurology ROS      Psychology   Negative psychology ROS              Physical Exam    Airway    Mallampati score: I  TM Distance: >3 FB  Neck ROM: full     Dental       Cardiovascular  Comment: Negative ROS, Rhythm: regular, Rate: normal, Cardiovascular exam normal    Pulmonary  Pulmonary exam normal     Other Findings        Anesthesia Plan  ASA Score- 2     Anesthesia Type- epidural with ASA Monitors  Additional Monitors:   Airway Plan:         Plan Factors-    Induction-     Postoperative Plan-     Informed Consent- Anesthetic plan and risks discussed with patient

## 2018-03-15 NOTE — BRIEF OP NOTE (RAD/CATH)
Attending at and scrubed at C section     Pt was taken to the or   Repeat C section was done  Upon entering into the perineum the uterus was entered and that incisions was extended to deliver the baby   There was no hempperiteneum noted    Baby was delivered in direct OP  There was nuchal cord x 1  Baby was delivered atraumatically   Baby was handed to peds attending    APGAR 8/9    Patient wished to have a tubal but do to extensive adhesion tubal was unable to be performed    Tubal ligation

## 2018-03-15 NOTE — PROGRESS NOTES
Patient fully dilated 0/+1 station   Pt is unwilling to contiue with the NOEMI  She wants to have a repeat c section   Attempts made to convience her that she should push but she does not want to push   Risk of c section discussed with patient   Risk of injury to bowel bladder and surrounding organs  Risk of bleeding and infection   Risk of blood transfusion   Risk of hysterectomy     Pt reports that she was in bed x 1 week after her previous c section and advised that she should strongly reconsider a c section but she is does not want to push any longer and wants a c section   Pt  is not happy with the decision of c section but pt has the say

## 2018-03-15 NOTE — H&P
H&P Exam - Obstetrics   Aureliano Recio 24 y o  female MRN: [id number]  Unit/Bed#: L&D 324-01 Encounter: [id number]      History of Present Illness     Chief Complaint: I broke my water    HPI:  Aureliano Recio is a 24 y o  [id number] female with an MARY of 3/16/2018, by Ultrasound at 39w5d weeks gestation who is being admitted for rupture of membranes  Contractions: some light irregular contractions after rupture of membranes  Vaginal Bleeding: Denies  Loss of Fluid:Rupture of membranes for clear odorless fluid at 2200  Fetal Movement: Reports good fetal movement    Patient has no other complaints this evening  She denies any headache, visual disturbance, right upper quadrant/epigastric pain, chest pain, shortness of breath, nausea or vomiting  PREGNANCY COMPLICATIONS:   H/o  section with successful  following  GBS positive  H/o preeclampsia  Asthma- well controlled has not had to use albuterol for years    OB History    Para Term  AB Living   4 2 2   1 2   SAB TAB Ectopic Multiple Live Births   1       2      # Outcome Date GA Lbr Ralph/2nd Weight Sex Delivery Anes PTL Lv   4 Current            3 SAB / 5w0d          2 Term 13 40w0d  3118 g (6 lb 14 oz) M   N DANICA   1 Term 11 40w0d  3459 g (7 lb 10 oz) M CS-LTranv  N DANICA      Complications: Preeclampsia          Baby complications/comments:     Review of Systems   Constitutional: Negative for chills and fever  Eyes: Negative for visual disturbance  Respiratory: Negative for chest tightness, shortness of breath and wheezing  Cardiovascular: Negative for chest pain, palpitations and leg swelling  Gastrointestinal: Negative for abdominal pain, constipation, diarrhea, nausea and vomiting  Genitourinary: Negative for dysuria, flank pain, frequency, hematuria and urgency  Musculoskeletal: Negative for arthralgias and myalgias  Neurological: Negative for dizziness, weakness, light-headedness and headaches  Historical Information   Past Medical History:   Diagnosis Date    Asthma     Ear ache     GERD (gastroesophageal reflux disease)     Hx of pre-eclampsia in prior pregnancy, currently pregnant     Severe preeclampsia      Past Surgical History:   Procedure Laterality Date     SECTION       Social History   History   Alcohol Use No     History   Drug Use No     History   Smoking Status    Never Smoker   Smokeless Tobacco    Never Used     Family History:   Family History   Problem Relation Age of Onset    Hypertension Mother     Hypertension Father        Meds/Allergies    {  Prescriptions Prior to Admission   Medication    albuterol (PROVENTIL HFA,VENTOLIN HFA) 90 mcg/act inhaler    Prenatal Vit-Fe Fumarate-FA (PNV PRENATAL PLUS MULTIVITAMIN) 27-1 MG TABS    carbamide peroxide (DEBROX) 6 5 % otic solution    nitrofurantoin (MACROBID) 100 mg capsule    sucralfate (CARAFATE) 1 g tablet      No Known Allergies    OBJECTIVE:    Vitals:   /68 (Patient Position: Sitting)   Pulse 78   Temp 98 3 °F (36 8 °C) (Oral)   Resp 18   Ht 4' 8" (1 422 m)   Wt 87 1 kg (192 lb)   LMP 2017 (Exact Date) Comment: Two positive tests at home  SpO2 98%   BMI 43 05 kg/m²   Body mass index is 43 05 kg/m²  Physical Exam   Constitutional: She is oriented to person, place, and time  She appears well-developed and well-nourished  No distress  HENT:   Head: Normocephalic and atraumatic  Neck: Normal range of motion  Neck supple  Cardiovascular: Normal rate, regular rhythm and normal heart sounds  Exam reveals no gallop and no friction rub  No murmur heard  Pulmonary/Chest: Effort normal and breath sounds normal  No respiratory distress  She has no wheezes  She has no rales  Abdominal: Soft  There is no tenderness  There is no rebound and no guarding  Genitourinary: Vagina normal    Musculoskeletal: She exhibits edema     +3 pitting pedal edema to the knee bilaterally- she states this is not a new problem and happens every pregnancy  Neurological: She is alert and oriented to person, place, and time  Skin: Skin is warm and dry  She is not diaphoretic  Psychiatric: She has a normal mood and affect  Her behavior is normal    Vitals reviewed  SVE: Dilation: 1-2  Effacement (%): 80  Cervical Characteristics: Anterior  Station: -2  Presentation: Vertex  Method: Manual  OB Examiner: Marlene    FHT:  Baseline Rate: 150 bpm  Variability: Moderate 6-25 bpm  Accelerations: 15 x 15 or greater, At variable times  TOCO:   Contraction Frequency (minutes): irregular  Contraction Duration (seconds): 60-80  Contraction Quality: Mild      Prenatal Labs:   Blood type: A+  Antigen Screen: negative  Rubella: Immune  HIV: Negative  RPR: NR  Hep B: negative  Diabetes Screen: 130  GBS: positive      Assessment/Plan     ASSESSMENT:  23 yo  at 39w5d weeks gestation with PROM  History of  section with successful  following  GBS positive  H/o Preeclampsia- elevated initial BP on admission with previous preeclamptic orders ordered yet not completed    PLAN:   1) Admit to L&D   2) CBC, RPR, Blood Type   3) Preeclamptic labs   4) Penicillin 5 million units followed by 2 5 million units IV q 4 hours for GBS prophylaxis   5) Analgesia and/or epidural at patient request   6) Anticipate    7) Manage expectantly at this time- start pitocin if no cervical change is made     D/w Dr Domingo Escobar    This patient will be an INPATIENT  and I certify the anticipated length of stay is >2 Midnights        Negrito Watkins MD  3/14/2018  10:58 PM

## 2018-03-15 NOTE — OB LABOR/OXYTOCIN SAFETY PROGRESS
Oxytocin Safety Progress Check Note - Fran Acuna 24 y o  female MRN: [id number]    Unit/Bed#: L&D 324-01 Encounter: [id number]       Covering SOD:    25 y/o  at 44 weeks admitted for SROM 3/14 @ 10pm clear  Past medical / surgical / social history reviewed  Ob history signifacant for    C/S for PEC EFW 3459 gms    precipitous delivery in er EFW - 3118gms    SAB @ 5 weeks  Currnent pregnancy uncomplicated    BP range in care - 15/56 to 134/84   BP in L&D - 144-117/58-86    VE on admission -   Current VE - 4-//-1 vertex (7am)    Cat 1 tracing - irregular contractions     Labs Reviewed -- Rh positive          GBS + treated          HIV negative               A/P   In labor  Next exam if no to minimal cervical change will place a IUPC  Cont monitor and anticipate      Grady Novoa MD  03/15/18       Obstetric History       T2      L2     SAB1   TAB0   Ectopic0   Multiple0   Live Births2      Gestational Age: 39w6d  Dose (soraida-units/min) Oxytocin: 6 soraida-units/min  Contraction Frequency (minutes): 3-5  Contraction Quality: Moderate  Tachysystole: No   Dilation: 4-5        Effacement (%): 90  Station: -1  Baseline Rate: 125 bpm     FHR Category: Category I          Notes/comments:              Grady Novoa MD 3/15/2018 8:54 AM

## 2018-03-15 NOTE — OB LABOR/OXYTOCIN SAFETY PROGRESS
Labor Progress Note - Lucila Colbert 24 y o  female MRN: [id number]    Unit/Bed#: L&D 324-01 Encounter: [id number]    Obstetric History       T2      L2     SAB1   TAB0   Ectopic0   Multiple0   Live Births2      Gestational Age: 37w11d     Contraction Frequency (minutes): 2-4  Contraction Quality: Moderate  Tachysystole: No   Dilation: 4-5        Effacement (%): 90  Station: -1  Baseline Rate: 130 bpm     FHR Category: Category I    Notes/comments:   Patient starting to feel contractions more- declines pain management or epidural at this time  Making cervical change to now 4-5/90/-2  FHT remains cat I  Will continue to expectantly manage for now- will recheck when patient more uncomfortable in order to minimize checks to reduce infection  Labs reviewed and although only patient's first blood pressure was >140/90 Protein/ Cr ratio was >0 3  Will monitor closely      To be d/w Dr Shanda Cosby MD 3/15/2018 3:47 AM

## 2018-03-15 NOTE — PROGRESS NOTES
Patient after long discussion decided on epidural   Cat 1 tracing  Irregular contraction   After epidural for increase pitocin

## 2018-03-15 NOTE — DISCHARGE INSTRUCTIONS
Section   WHAT YOU SHOULD KNOW:   A  delivery, or , is abdominal surgery to deliver your baby  There are many reasons you may need a   · A  may be scheduled before labor if you had a  with your last baby  It may be scheduled if your baby is not positioned normally, or you are pregnant with more than 1 baby  · Your caregiver may perform an emergency  during labor to prevent life-threatening complications for you or your baby  A  may be done if your cervix does not dilate after several hours of active labor  · Other reasons for a  include maternal infections and problems with the placenta  AFTER YOU LEAVE:   Medicines:   · Prescription pain medicine  may be given  Ask how to take this medicine safely  · Acetaminophen  decreases pain and fever  It is available without a doctor's order  Ask how much to take and how often to take it  Follow directions  Acetaminophen can cause liver damage if not taken correctly  · NSAIDs  help decrease swelling and pain or fever  This medicine is available with or without a doctor's order  NSAIDs can cause stomach bleeding or kidney problems in certain people  If you take blood thinner medicine, always ask your obstetrician if NSAIDs are safe for you  Always read the medicine label and follow directions  · Take your medicine as directed  Contact your obstetrician (OB) if you think your medicine is not helping or if you have side effects  Tell him if you are allergic to any medicine  Keep a list of the medicines, vitamins, and herbs you take  Include the amounts, and when and why you take them  Bring the list or the pill bottles to follow-up visits  Carry your medicine list with you in case of an emergency  Follow up with your OB as directed: You may need to return to have your stitches or staples removed   Write down your questions so you remember to ask them during your visits  Wound care:  Carefully wash your wound with soap and water every day  Keep your wound clean and dry  Wear loose, comfortable clothes that do not rub against your wound  Ask your OB about bathing and showering  Drink plenty of liquids: You can lower your risk for a blood clot if you drink plenty of liquids  Ask how much liquid to drink each day and which liquids are best for you  Limit activity until you have fully recovered from surgery:   · Ask when it is safe for you to drive, walk up stairs, lift heavy objects, and have sex  · Ask when it is okay to exercise, and what types of exercise to do  Start slowly and do more as you get stronger  Contact your OB if:   · You have heavy vaginal bleeding that fills 1 or more sanitary pads in 1 hour  · You have a fever  · Your incision is swollen, red, or draining pus  · You have questions or concerns about yourself or your baby  Seek care immediately or call 911 if:   · Blood soaks through your bandage  · Your stitches come apart  · You feel lightheaded, short of breath, and have chest pain  · You cough up blood  · Your arm or leg feels warm, tender, and painful  It may look swollen and red  © 2014 [address] is for End User's use only and may not be sold, redistributed or otherwise used for commercial purposes  All illustrations and images included in CareNotes® are the copyrighted property of A D A M [employer] Inc  or Zach Caceres  The above information is an  only  It is not intended as medical advice for individual conditions or treatments  Talk to your doctor, nurse or pharmacist before following any medical regimen to see if it is safe and effective for you

## 2018-03-15 NOTE — LACTATION NOTE
This note was copied from a baby's chart  CONSULT - LACTATION  Baby Boy  Delayne Molt) Ganesh 0 days male MRN: [id number]    [facility] NURSERY Room / Bed: L&D 314(N)/L&D 314(N) Encounter: [id number]    Maternal Information     MOTHER:  Marilee Andersen  Maternal Age: 24 y o    OB History: #: 1, Date: 11, Sex: Male, Weight: 3459 g (7 lb 10 oz), GA: 40w0d, Delivery: , Low Transverse, Apgar1: None, Apgar5: None, Living: Living, Birth Comments: None    #: 2, Date: 13, Sex: Male, Weight: 3118 g (6 lb 14 oz), GA: 40w0d, Delivery: , Spontaneous, Apgar1: None, Apgar5: None, Living: Living, Birth Comments: None    #: 3, Date: 17, Sex: None, Weight: None, GA: 5w0d, Delivery: None, Apgar1: None, Apgar5: None, Living: None, Birth Comments: None    #: 4, Date: None, Sex: None, Weight: None, GA: None, Delivery: None, Apgar1: None, Apgar5: None, Living: None, Birth Comments: None   Previouse breast reduction surgery? No    Lactation history:   Has patient previously breast fed:  (attempted)   How long had patient previously breast fed:     Previous breast feeding complications:       Past Surgical History:   Procedure Laterality Date     SECTION         Birth information:  YOB: 2016   Time of birth: 2:38 PM   Sex: male   Delivery type: , Low Transverse   Birth Weight: 3430 g (7 lb 9 oz)   Percent of Weight Change: 0%     Gestational Age: 37w11d   [unfilled]    Assessment     Breast and nipple assessment: did not assess at this time    [place] Assessment: did not assess at this time    Feeding assessment: tried latching, but verb baby did not want it     LATCH:  Latch: Grasps breast, tongue down, lips flanged, rhythmic sucking   Audible Swallowing: None   Type of Nipple: Everted (After stimulation)   Comfort (Breast/Nipple): Soft/non-tender   Hold (Positioning): No assist from staff, mother able to position/hold infant   LATCH Score: 8          Feeding recommendations:  breast feed on demand     Breastfeeding booklet given and reviewed with mother  Mom verb she was originally going to just bottle feed, but she tried to latch and he did not want it  I did not originally see mom because I thought she was just planning to bottle feed  I discussed supply and demand and the importance of starting breastfeeding early and often  I discussed risks of early supplementation and recommended she call me or nursing for assistance next feeding  I went over the ready, set, baby booklet with mom  Enc skin to skin and discussed feeding cues and ways to know baby is getting enough       Hussein Garcia RN 3/15/2018 6:26 PM

## 2018-03-15 NOTE — OP NOTE
OPERATIVE REPORT  PATIENT NAME: Ignacio Ribeiro    :  1990  MRN: [id number]  Pt Location: [facility] L&D OR ROOM 01    SURGERY DATE: 3/15/2018    Surgeon(s) and Role:     * Silvestre Raygoza MD - Assisting     * Abimael Bhagat MD - Primary    Preop Diagnosis:  RLTCS    Procedure(s) (LRB):   SECTION () REPEAT (N/A)    Specimen(s):  ID Type Source Tests Collected by Time Destination   A :  Tissue (Placenta on Hold) OB Only Placenta PLACENTA IN STORAGE Abimael Bhagat MD 3/15/2018 1502        Estimated Blood Loss:   1000mL    Drains:  Urethral Catheter Latex 16 Fr  (Active)   Collection Container Standard drainage bag 3/15/2018  4:00 PM   Securement Method Securing device (Describe) 3/15/2018  4:00 PM   Number of days: 0       Anesthesia Type:   Epidural    Operative Indications:  Maternal request    Operative Findings:  Grossly adherent uterus to overlying scar tissue-unable to exteriorize for desired tubal ligation  Dense bladder adhesions  Extremely thin lower uterine segment-hysterotomy was inherently made with entry into the peritoneum  Viable male     Complications:   None    Procedure and Technique:    Operative Report - OB/GYN   Ignacio Ribeiro 24 y o  female MRN: [id number]  Unit/Bed#: L&D 314-01 Encounter: [id number]    Indications: patient declines vag del attempt    Pre-operative Diagnosis:   1  39 week 6 day pregnancy  2  RLTCS  3  Failed TOLAC  4  GBS positive    Post-operative Diagnosis: same, delivered    Surgeon: Abimael Bhagat MD    Assistant(s): Silvestre Raygoza MD    Findings:  1  Delivery of viable male on 03/15/18 at 1438, weight 7lbs 9oz;  Apgar scores of 8 at one minute and 9 at five minutes  2  Normal appearing placenta with centrally-inserted 3 vessel cord  3  Clear amniotic fluid  4  Grossly adherent uterus to overlying scar tissue-unable to exteriorize for desired tubal ligation  5  Dense bladder adhesions  6   Extremely thin lower uterine segment-hysterotomy was inherently made with entry into the peritoneum  7  Unable to visualize tubes and ovaries    Specimens:   1  Arterial and venous cord gases  2  Cord blood  3  Segment of umbilical cord  4  Placenta to storage     Estimated Blood Loss:  1000 mL    Drains: Goodwin catheter           Complications:  None; patient tolerated the procedure well  Disposition: PACU  and hemodynamically stable           Condition: stable    Operative Technique:     Patient was admitted for spontaneous rupture of membranes and was initially managed expectantly  She was augmented with Pitocin titration and a fore bag was ruptured  She received an epidural for anesthesia  She progressed quickly thereafter to complete and began pushing  Fetal vertex was believed to be OP position and asynclitic  Patient pushed approximately 1 hour after which she requested a repeat low-transverse  section as she no longer desired to push any further  Decision was made to proceed with  section due to maternal request after lack of fetal descent after 1 hour of maternal pushing  Patient was made aware of these findings and the proposed plan  Risks, benefits, possible complications, alternate treatment options, and expected outcomes were discussed with the patient  The patient agreed with the proposed plan and gave informed consent  The patient was taken to the operating room where she was properly identified to the OR staff and attending physician  She had already received epidural anesthesia during her labor course, which was augmented appropriately preoperatively  Fetal heart tones were appreciated and found to be appropriate  The vagina was prepped with Betadine  A Goodwin catheter and SCDs were placed  The abdomen was prepped with Chloraprep and following appropriate drying time, the patient was draped in the usual sterile manner for a Pfannenstiel incision    The patient had received Ancef 2g IV as well as Azithromycin 500mg IV pre-operatively for prophylaxis  A Time Out was held and the above information confirmed  The patient was identified as Aureliano Recio and the procedure verified as  Delivery  A Pfannenstiel incision was made and carried down through the underlying subcutaneous tissue to the fascia using a scalpel  Rectus fascia was then incised  We then proceeded in Addi-Villalobos fashion  All anatomic layers were well-demarcated  The rectus muscles were  and the peritoneum was entered bluntly  Fetal parts were immediately palpated, indicating that hysterotomy was made at the time of entry into the peritoneum  Surgeons hand was inserted through the hysterotomy and the fetal head was palpated, elevated, and delivered through the uterine incision with the assistance of fundal pressure  Baby had spontaneous cry with good tone  During the time of delay cord clamping, the placenta began to spontaneously separate and thus it was delivered before cord gases could be obtained  The umbilical cord was clamped and cut  The infant was handed off to the  providers  Cord blood was able to be obtained by the RN and a segment of umbilical cord were obtained for evaluation and promptly sent to the lab  The placenta  was noted to have a centrally inserted 3 vessel cord  This was sent for storage  Secondary to dense adhesions surrounding the uterus, it was unable to be exteriorized and was repaired in situ  A moist lap sponge was used to clear the cavity of clots and products of conception  The uterine incision was closed with a running locked suture of 0 Vicryl  There was noted to be a midline extension extending inferiorly to the vesicouterine peritoneum  This was repaired separately with a running locked suture of 0 Vicryl  Good hemostasis was confirmed upon uterine closure  Surgicel was applied over the hysterotomy   At this time the patient was notified that secondary to dense adhesions and scar tissue her fallopian tubes could not be visualized appropriately in order to perform a bilateral tubal ligation as per her request preoperatively  She was counseled that the procedure could be attempted laparoscopically in 6 weeks  She expressed an understanding of all discussed  The fascia was closed with a running suture of 0 Vicryl  Subcutaneous tissues were closed with 3-0 Chromic suture  The skin was closed with 4-0 Monocryl in a subcuticular fashion  Sterile dressing was applied and an abdominal binder was then placed  At the conclusion of the procedure, all needle, sponge, and instrument counts were noted to be correct x2  The patient tolerated the procedure well and was transferred to her the recovery room in stable condition  Dr Mariza Staley was present and participated in all key portions of the case        Patient Disposition:  PACU  and hemodynamically stable    SIGNATURE: Edwin Loco MD  DATE: March 15, 2018  TIME: 5:39 PM

## 2018-03-15 NOTE — DISCHARGE SUMMARY
Discharge Summary - OB/GYN   Lubna Morton 24 y o  female MRN: [id number]  Unit/Bed#: L&D 768-82 Encounter: [id number]      Admission Date: 3/14/2018     Discharge Date: 3/18/18    Admitting Diagnosis:   1  Pregnancy at 39w6d  2  PROM  3  RLTCS  4  GBS positive  5  Failed TOLAC    Discharge Diagnosis:   Same, delivered      Procedures: repeat  section, low transverse incision    Attending: Michael Zarate MD    Hospital Course:     Lubna Morton is a 24 y o  [id number] at 39w6d wks who was initially admitted for PROM  She was initially managed expectantly but required pitocin titration  She received an epidural for anesthesia and progressed to complete  Patient attempted pushing for 1 hour after which there was failure of fetal descent and maternal exhaustion  Patient elected to have a RLTCS  She delivered a viable male  on 3/15/18 at 1438  Weight 7lbs 9oz via repeat  section, low transverse incision  Apgars were 8 (1 min) and 9 (5 min)   was transferred to  nursery  Patient tolerated the procedure well and was transferred to recovery in stable condition  Her post-operative course was uncomplicated  Preoperative hemaglobin was 9 6, postoperative was 8 0, but patient remained asymptomatic  Her postoperative pain was well controlled with oral analgesics  On day of discharge, she was ambulating and able to reasonably perform all ADLs  She was voiding and had appropriate bowel function  Pain was well controlled  She was discharged home on post-operative day #3 without complications  Patient was instructed to follow up with her OB as an outpatient and was given appropriate warnings to call provider if she develops signs of infection or uncontrolled pain      Patient desired bilateral salpingectomy at the time of  section; however, secondary to the dense scar tissue, tubes were unable to be visualized, will set patient up for laparoscopic salpingectomy at her postpartum visit for 6 weeks postpartum  Complications: none apparent    Condition at discharge: stable     Discharge instructions/Information to patient and family:   See after visit summary for information provided to patient and family  Provisions for Follow-Up Care:  See after visit summary for information related to follow-up care and any pertinent home health orders  Disposition: Home    Planned Readmission: No    Discharge Medications: For a complete list of the patient's medications, please refer to her med rec

## 2018-03-15 NOTE — OB LABOR/OXYTOCIN SAFETY PROGRESS
Oxytocin Safety Progress Check Note - Gabino Metzger 24 y o  female MRN: [id number]    Unit/Bed#: L&D 324-01 Encounter: [id number]    Obstetric History       T2      L2     SAB1   TAB0   Ectopic0   Multiple0   Live Births2      Gestational Age: 37w11d  Dose (soraida-units/min) Oxytocin: 6 soraida-units/min (per Dr Susan Amaro verbal order)  Contraction Frequency (minutes): 2-3  Contraction Quality: Strong  Tachysystole: No   Dilation: 9        Effacement (%): 100  Station: 0  Baseline Rate: 135 bpm     FHR Category: Category II          Notes/comments:     Patient received epidural, her pain has improved, she reports feeling pressure  Having recurrent early and variable decelerations, repositioning was performed, O2 increased fluids and decreased pitocin  SVE is now 9/100/0  Will d/c pitocin if needed              Lexy Grossman MD 3/15/2018 12:26 PM

## 2018-03-15 NOTE — ANESTHESIA POSTPROCEDURE EVALUATION
Post-Op Assessment Note      CV Status:  Stable    Mental Status:  Alert and awake    Hydration Status:  Euvolemic    PONV Controlled:  Controlled    Airway Patency:  Patent    Post Op Vitals Reviewed: Yes          Staff: Anesthesiologist     Post-op block assessment: catheter intact and no complications        BP      Temp      Pulse     Resp      SpO2

## 2018-03-16 LAB
BASOPHILS # BLD AUTO: 0.05 THOUSANDS/ΜL (ref 0–0.1)
BASOPHILS NFR BLD AUTO: 0 % (ref 0–1)
EOSINOPHIL # BLD AUTO: 0.03 THOUSAND/ΜL (ref 0–0.61)
EOSINOPHIL NFR BLD AUTO: 0 % (ref 0–6)
ERYTHROCYTE [DISTWIDTH] IN BLOOD BY AUTOMATED COUNT: 19.2 % (ref 11.6–15.1)
HCT VFR BLD AUTO: 27 % (ref 34.8–46.1)
HGB BLD-MCNC: 8 G/DL (ref 11.5–15.4)
LYMPHOCYTES # BLD AUTO: 2.55 THOUSANDS/ΜL (ref 0.6–4.47)
LYMPHOCYTES NFR BLD AUTO: 18 % (ref 14–44)
MCH RBC QN AUTO: 19.9 PG (ref 26.8–34.3)
MCHC RBC AUTO-ENTMCNC: 29.6 G/DL (ref 31.4–37.4)
MCV RBC AUTO: 67 FL (ref 82–98)
MONOCYTES # BLD AUTO: 1.27 THOUSAND/ΜL (ref 0.17–1.22)
MONOCYTES NFR BLD AUTO: 9 % (ref 4–12)
NEUTROPHILS # BLD AUTO: 10.54 THOUSANDS/ΜL (ref 1.85–7.62)
NEUTS SEG NFR BLD AUTO: 73 % (ref 43–75)
NRBC BLD AUTO-RTO: 0 /100 WBCS
PLATELET # BLD AUTO: 153 THOUSANDS/UL (ref 149–390)
RBC # BLD AUTO: 4.03 MILLION/UL (ref 3.81–5.12)
WBC # BLD AUTO: 14.44 THOUSAND/UL (ref 4.31–10.16)

## 2018-03-16 PROCEDURE — 85025 COMPLETE CBC W/AUTO DIFF WBC: CPT | Performed by: OBSTETRICS & GYNECOLOGY

## 2018-03-16 RX ADMIN — OXYCODONE HYDROCHLORIDE AND ACETAMINOPHEN 2 TABLET: 5; 325 TABLET ORAL at 17:24

## 2018-03-16 RX ADMIN — OXYCODONE HYDROCHLORIDE AND ACETAMINOPHEN 1 TABLET: 5; 325 TABLET ORAL at 12:45

## 2018-03-16 RX ADMIN — DOCUSATE SODIUM 100 MG: 100 CAPSULE, LIQUID FILLED ORAL at 08:42

## 2018-03-16 RX ADMIN — HYDROMORPHONE HYDROCHLORIDE 0.5 MG: 1 INJECTION, SOLUTION INTRAMUSCULAR; INTRAVENOUS; SUBCUTANEOUS at 03:27

## 2018-03-16 RX ADMIN — ENOXAPARIN SODIUM 40 MG: 40 INJECTION SUBCUTANEOUS at 08:42

## 2018-03-16 NOTE — PROGRESS NOTES
Progress Note - OB/GYN   Zackary  y o  female MRN: [id number]  Unit/Bed#: L&D 314-01 Encounter: [id number]    Assessment:  PP/POD#1 s/p Repeat low transverse  section, stable    Plan:  1  Routine post op care  -noel to be discontinued in the am  -f/u voiding trial  -Hb:9 6-->8 0, asymptomatic  2  Preeclampsia without SF  -BP:130s/60s    Subjective/Objective   Chief Complaint:     PP/POD#1 s/p Repeat low transverse  section    Subjective:     Pain: no  Tolerating PO: yes  Voiding: no  Flatus: no  BM: no  Ambulating: no  Breastfeeding: Breastfeeding  Chest pain: no  Shortness of breath: no  Leg pain: no  Lochia: minimal    Objective:     Vitals:  Vitals:    03/15/18 1620 03/15/18 1930 03/15/18 2342 18 0320   BP: 122/60 127/75 (!) 109/46 138/61   BP Location: Left arm Left arm Left arm Left arm   Pulse: 82 105 98 97   Resp: 20 19 16 17   Temp:  99 2 °F (37 3 °C) 98 9 °F (37 2 °C) 99 °F (37 2 °C)   TempSrc:  Oral Oral Oral   SpO2: 99% 96% 95% 97%   Weight:       Height:           Physical Exam:     Physical Exam   GEN: NAD  Lungs: CTAB  CV:RRR  Abdomen: soft, non tender, non distended, incision clean, dry, and intact  Ext: non tender, 3+ pitting edema  Uterine fundus firm and non-tender, at the umbilicus       Lab, Imaging and other studies: I have personally reviewed pertinent reports        Lab Results   Component Value Date    WBC 14 44 (H) 2018    HGB 8 0 (L) 2018    HCT 27 0 (L) 2018    MCV 67 (L) 2018     2018               Ernestina Hazel MD  18

## 2018-03-16 NOTE — LACTATION NOTE
This note was copied from a baby's chart  Mom states she wants to do both breast and bottle because that is what she did with her other kids  Recommended exclusive breast feeding for 2-4 weeks to develop good milk supply then pump and she may not need much formula  Mom said she wants to try now that baby is latching better and she feels better  Discussed risks for early supplementation: over feeding, longer digestion times, engorgement for mom, lower milk supply for mom, and nipple confusion  Benefits of breast feeding for infant's intestinal tract, less engorgement for mom, protection from multiple disease processes as infant develops, avoidance of over feeding for infant, less nipple confusion, and increased health benefits for mom  Breastfeeding discharge booklet given and reviewed with mother  Informed of outpatient services available at the [facility] and [address] [place] [address] for Lactation as well as post partum wellness, support group and classes

## 2018-03-17 RX ADMIN — OXYCODONE HYDROCHLORIDE AND ACETAMINOPHEN 2 TABLET: 5; 325 TABLET ORAL at 12:10

## 2018-03-17 RX ADMIN — IBUPROFEN 600 MG: 600 TABLET ORAL at 23:10

## 2018-03-17 RX ADMIN — DOCUSATE SODIUM 100 MG: 100 CAPSULE, LIQUID FILLED ORAL at 09:10

## 2018-03-17 RX ADMIN — OXYCODONE HYDROCHLORIDE AND ACETAMINOPHEN 2 TABLET: 5; 325 TABLET ORAL at 00:45

## 2018-03-17 RX ADMIN — OXYCODONE HYDROCHLORIDE AND ACETAMINOPHEN 2 TABLET: 5; 325 TABLET ORAL at 18:50

## 2018-03-17 RX ADMIN — DOCUSATE SODIUM 100 MG: 100 CAPSULE, LIQUID FILLED ORAL at 18:15

## 2018-03-17 RX ADMIN — ENOXAPARIN SODIUM 40 MG: 40 INJECTION SUBCUTANEOUS at 09:10

## 2018-03-17 NOTE — PROGRESS NOTES
Progress Note - OB/GYN   [facility] MR[facility]: [id number]  Unit/Bed#: L&D 314-01 Encounter: [id number]    Assessment:  POD#2 s/p Repeat low transverse  section, stable    Plan:  1  Preeclampsia without severe features: BP q24h 100-110s/60-80s, asymptomatic, continue to monitor  2  Continue routine postoperative care  3  Encourage ambulation  4  Encourage breastfeeding  5  Pain control as needed    Dispositions: stable, anticipate discharge POD #3    Subjective/Objective   Chief Complaint:     POD#3 s/p Repeat low transverse  section    Subjective:     Pain: incisional, improved with percocet  Tolerating PO: yes  Voiding: yes  Flatus: yes  BM: no  Ambulating: yes  Breastfeeding: Breastfeeding  Chest pain: no  Shortness of breath: no  Leg pain: no  Lochia: minimal    Objective:     Vitals:  Vitals:    18 1119 18 1502 18 1900 18 0830   BP: 103/63 118/83 113/59 118/58   BP Location: Right arm Right arm Right arm    Pulse: 96 100 90 81   Resp:    Temp: 98 7 °F (37 1 °C) 98 2 °F (36 8 °C) 97 9 °F (36 6 °C) 98 1 °F (36 7 °C)   TempSrc: Oral Oral Oral Oral   SpO2: 96% 97%     Weight:       Height:           Physical Exam:     Physical Exam   Constitutional: She is oriented to person, place, and time  She appears well-developed and well-nourished  Cardiovascular: Normal rate, regular rhythm and normal heart sounds  Pulmonary/Chest: Effort normal and breath sounds normal    Abdominal: Soft  Bowel sounds are normal    Incision clean, dry, and intact  Closed with running absorbable sutures  Neurological: She is alert and oriented to person, place, and time  Uterine fundus firm and non-tender, at the umbilicus       Lab, Imaging and other studies: I have personally reviewed pertinent reports        Lab Results   Component Value Date    WBC 14 44 (H) 2018    HGB 8 0 (L) 2018    HCT 27 0 (L) 2018    MCV 67 (L) 2018     03/16/2018               Luisana Honeycutt,   03/17/18

## 2018-03-18 VITALS
WEIGHT: 192 LBS | TEMPERATURE: 97.8 F | RESPIRATION RATE: 18 BRPM | DIASTOLIC BLOOD PRESSURE: 79 MMHG | SYSTOLIC BLOOD PRESSURE: 125 MMHG | HEART RATE: 78 BPM | BODY MASS INDEX: 43.19 KG/M2 | HEIGHT: 56 IN | OXYGEN SATURATION: 98 %

## 2018-03-18 PROCEDURE — 90707 MMR VACCINE SC: CPT | Performed by: STUDENT IN AN ORGANIZED HEALTH CARE EDUCATION/TRAINING PROGRAM

## 2018-03-18 RX ORDER — OXYCODONE HYDROCHLORIDE AND ACETAMINOPHEN 5; 325 MG/1; MG/1
1 TABLET ORAL EVERY 4 HOURS PRN
Qty: 15 TABLET | Refills: 0 | Status: SHIPPED | OUTPATIENT
Start: 2018-03-18 | End: 2018-03-28

## 2018-03-18 RX ADMIN — ENOXAPARIN SODIUM 40 MG: 40 INJECTION SUBCUTANEOUS at 08:35

## 2018-03-18 RX ADMIN — MEASLES, MUMPS, AND RUBELLA VIRUS VACCINE LIVE 0.5 ML: 1000; 12500; 1000 INJECTION, POWDER, LYOPHILIZED, FOR SUSPENSION SUBCUTANEOUS at 08:36

## 2018-03-18 NOTE — PROGRESS NOTES
Progress Note - OB/GYN   Jamie Ryan 24 y o  female MRN: [id number]  Unit/Bed#: L&D 314-01 Encounter: [id number]    Assessment:  PP/POD#3 s/p Repeat low transverse  section, stable    Plan:  1  Routine post op care  2  PreE without severe features  -BP: 130s/70s  3  VTE 3  -Lovenox 40mg daily while inpatient  4  Contraception  -patient desires permanent sterilization  -will schedule her for this for 6 weeks postpartum as her tubes were unable to be visualized at the time of   -declines any bridge until her tubal  5  Discharge home  -f/u in 1 week for post-op incision check  6  Acute on chronic anemia  -Hb: 9 6-->8 0, asymptomatic, continue PO iron    Subjective/Objective   Chief Complaint:     PP/POD#3 s/p Repeat low transverse  section    Subjective:     Pain: yes  Tolerating PO: yes  Voiding: yes  Flatus: yes  BM: no  Ambulating: yes  Breastfeeding: Breastfeeding  Chest pain: no  Shortness of breath: no  Leg pain: no  Lochia: minimal    Objective:     Vitals:  Vitals:    18 1900 18 0830 18 1510 18 2310   BP: 113/59 118/58 124/78 138/78   BP Location: Right arm   Left arm   Pulse: 90 81 91 80   Resp:    Temp: 97 9 °F (36 6 °C) 98 1 °F (36 7 °C) 98 2 °F (36 8 °C) 98 4 °F (36 9 °C)   TempSrc: Oral Oral Oral Oral   SpO2:    98%   Weight:       Height:           Physical Exam:     Physical Exam   GEN: NAD  Lungs: CTAB  CV:RRR  Abdomen: soft, non tender, non distended  Ext: non tender, edematous, negative homans sign  Uterine fundus firm and non-tender, -1 cm below the umbilicus       Lab, Imaging and other studies: I have personally reviewed pertinent reports        Lab Results   Component Value Date    WBC 14 44 (H) 2018    HGB 8 0 (L) 2018    HCT 27 0 (L) 2018    MCV 67 (L) 2018     2018               Catarino Hutton MD  18

## 2018-03-18 NOTE — CASE MANAGEMENT
Notification of Maternity Inpatient Admission/Maternity Inpatient Authorization Request  This is a Notification of Maternity Inpatient Admission/Maternity Inpatient Authorization Request to our facility [address] [facility]  Please be advised that this patient is currently in our facility under Inpatient Status  Below you will find the Birth/[place] Summary, Attending Physician and Facilitys information including NPI# and contact for the Utilization  assigned to the [facility] where the patient is receiving services  Please feel free to contact the Utilization Review Department with any questions  Mothers Information:  Adilia Beck  MRN: [id number]  YOB: 1990  Admission Date: 3/14/2018  9:26 PM  Discharge Date: 3/18/2018 12:30 PM  Disposition: Home/Self Care  Admitting Diagnosis:  O82  DELIVERY   Amniotic fluid leaking [O42 90]  Verona Information:  Estimated Date of Delivery: 3/16/18  Information for the patient's :  Flaquita Jose) [[id number]]     [place] Delivery Information:  Sex: male  Delivered 3/15/2018 2:38 PM by , Low Transverse; Gestational Age: 37w11d     Measurements:  Weight: 7 lb 9 oz (3430 g); Height: 20"    APGAR 1 minute 5 minutes 10 minutes   Totals: 8 9      OB History      Para Term  AB Living    5 3 3   1 3    SAB TAB Ectopic Multiple Live Births    1     0 3        Attending Physician:  JAY Ennis    Specialty- Obstetrics and Gynecology  [facility] ID- [id number]  [address]   [facility], [address]  Phone 1: [phone]  Fax: [phone]    Facility: [address], [facility], [facility][note 3] [address]   [phone]  Tax ID: [id number]  NPI: [id number]    [address] [facility] in the [facility] by Zach Caceres for 2017  Network Utilization Review Department  Phone: [phone]; Fax [phone]  ATTENTION: The Network Utilization Review Department is now centralized for our 7 Facilities  Make a note that we have a new phone and fax numbers for our Department  Please call with any questions or concerns to [phone] and carefully follow the prompts so that you are directed to the right person  All voicemails are confidential  Fax any determinations, approvals, denials, and requests for initial or continue stay review clinical to [phone]  **Due to HIGH CALL volume, it would be easier if you could please send daily logs  This will expedite your requests and in part, help us provide discharge notifications faster   **

## 2018-03-18 NOTE — PLAN OF CARE
Problem: Knowledge Deficit  Goal: Patient/family/caregiver demonstrates understanding of disease process, treatment plan, medications, and discharge instructions  Complete learning assessment and assess knowledge base    Interventions:  - Provide teaching at level of understanding  - Provide teaching via preferred learning methods   Outcome: Adequate for Discharge      Problem: PAIN - ADULT  Goal: Verbalizes/displays adequate comfort level or baseline comfort level  Interventions:  - Encourage patient to monitor pain and request assistance  - Assess pain using appropriate pain scale  - Administer analgesics based on type and severity of pain and evaluate response  - Implement non-pharmacological measures as appropriate and evaluate response  - Consider cultural and social influences on pain and pain management  - Notify physician/advanced practitioner if interventions unsuccessful or patient reports new pain   Outcome: Adequate for Discharge      Problem: INFECTION - ADULT  Goal: Absence or prevention of progression during hospitalization  INTERVENTIONS:  - Assess and monitor for signs and symptoms of infection  - Monitor lab/diagnostic results  - Monitor all insertion sites, i e  indwelling lines, tubes, and drains  - Monitor endotracheal (as able) and nasal secretions for changes in amount and color  - [place] appropriate cooling/warming therapies per order  - Administer medications as ordered  - Instruct and encourage patient and family to use good hand hygiene technique  - Identify and instruct in appropriate isolation precautions for identified infection/condition   Outcome: Adequate for Discharge    Goal: Absence of fever/infection during neutropenic period  INTERVENTIONS:  - Monitor WBC  - Implement neutropenic guidelines   Outcome: Adequate for Discharge      Problem: SAFETY ADULT  Goal: Patient will remain free of falls  INTERVENTIONS:  - Assess patient frequently for physical needs  -  Identify cognitive and physical deficits and behaviors that affect risk of falls    -  [place] fall precautions as indicated by assessment   - Educate patient/family on patient safety including physical limitations  - Instruct patient to call for assistance with activity based on assessment  - Modify environment to reduce risk of injury  - Consider OT/PT consult to assist with strengthening/mobility   Outcome: Adequate for Discharge    Goal: Maintain or return to baseline ADL function  INTERVENTIONS:  -  Assess patient's ability to carry out ADLs; assess patient's baseline for ADL function and identify physical deficits which impact ability to perform ADLs (bathing, care of mouth/teeth, toileting, grooming, dressing, etc )  - Assess/evaluate cause of self-care deficits   - Assess range of motion  - Assess patient's mobility; develop plan if impaired  - Assess patient's need for assistive devices and provide as appropriate  - Encourage maximum independence but intervene and supervise when necessary  ¯ Involve family in performance of ADLs  ¯ Assess for home care needs following discharge   ¯ Request OT consult to assist with ADL evaluation and planning for discharge  ¯ Provide patient education as appropriate   Outcome: Adequate for Discharge    Goal: Maintain or return mobility status to optimal level  INTERVENTIONS:  - Assess patient's baseline mobility status (ambulation, transfers, stairs, etc )    - Identify cognitive and physical deficits and behaviors that affect mobility  - Identify mobility aids required to assist with transfers and/or ambulation (gait belt, sit-to-stand, lift, walker, cane, etc )  - [place] fall precautions as indicated by assessment  - Record patient progress and toleration of activity level on Mobility SBAR; progress patient to next Phase/Stage  - Instruct patient to call for assistance with activity based on assessment  - Request Rehabilitation consult to assist with strengthening/weightbearing, etc  Outcome: Adequate for Discharge      Problem: DISCHARGE PLANNING  Goal: Discharge to home or other facility with appropriate resources  INTERVENTIONS:  - Identify barriers to discharge w/patient and caregiver  - Arrange for needed discharge resources and transportation as appropriate  - Identify discharge learning needs (meds, wound care, etc )  - Arrange for interpretive services to assist at discharge as needed  - Refer to Case Management Department for coordinating discharge planning if the patient needs post-hospital services based on physician/advanced practitioner order or complex needs related to functional status, cognitive ability, or social support system   Outcome: Adequate for Discharge

## 2018-03-19 ENCOUNTER — TRANSITIONAL CARE MANAGEMENT (OUTPATIENT)
Dept: FAMILY MEDICINE CLINIC | Facility: CLINIC | Age: 28
End: 2018-03-19

## 2018-03-28 ENCOUNTER — ROUTINE PRENATAL (OUTPATIENT)
Dept: OBGYN CLINIC | Facility: CLINIC | Age: 28
End: 2018-03-28
Payer: COMMERCIAL

## 2018-03-28 DIAGNOSIS — Z98.890 POST-OPERATIVE STATE: Primary | ICD-10-CM

## 2018-03-28 PROBLEM — Z34.93 PRENATAL CARE IN THIRD TRIMESTER: Status: RESOLVED | Noted: 2018-03-12 | Resolved: 2018-03-28

## 2018-03-28 PROBLEM — Z98.891 HISTORY OF VAGINAL BIRTH AFTER CESAREAN: Status: RESOLVED | Noted: 2017-11-17 | Resolved: 2018-03-28

## 2018-03-28 PROBLEM — O09.899 RUBELLA NON-IMMUNE STATUS, ANTEPARTUM: Status: RESOLVED | Noted: 2017-11-17 | Resolved: 2018-03-28

## 2018-03-28 PROBLEM — Z28.39 RUBELLA NON-IMMUNE STATUS, ANTEPARTUM: Status: RESOLVED | Noted: 2017-11-17 | Resolved: 2018-03-28

## 2018-03-28 PROBLEM — Z3A.39 39 WEEKS GESTATION OF PREGNANCY: Status: RESOLVED | Noted: 2018-03-12 | Resolved: 2018-03-28

## 2018-03-28 PROBLEM — O09.292 HX OF PREECLAMPSIA, PRIOR PREGNANCY, CURRENTLY PREGNANT, SECOND TRIMESTER: Status: RESOLVED | Noted: 2017-11-17 | Resolved: 2018-03-28

## 2018-03-28 PROBLEM — O99.212 MATERNAL OBESITY, ANTEPARTUM, SECOND TRIMESTER: Status: RESOLVED | Noted: 2017-10-30 | Resolved: 2018-03-28

## 2018-03-28 PROBLEM — O99.820 GROUP B STREPTOCOCCUS CARRIER, ANTEPARTUM: Status: RESOLVED | Noted: 2017-11-17 | Resolved: 2018-03-28

## 2018-03-28 PROCEDURE — 99213 OFFICE O/P EST LOW 20 MIN: CPT | Performed by: OBSTETRICS & GYNECOLOGY

## 2018-03-28 NOTE — PROGRESS NOTES
Assessment      Problem List Items Addressed This Visit     Post-operative state - Primary        24year old now [id number] s/p RLTCS on 3/15/18 here for her post-operative visit     Doing well postoperatively  Operative findings again reviewed  Plan     1  Continue any current medications  2  Wound care discussed  3  Activity restrictions: no heavy lifting for 6 weeks postpartum  4  Anticipated return to work: 6-8 weeks postpartum  5  Follow up: 2 weeks for postpartum visit  6  Patient desires permanent sterilization which was unable to be performed at the time of  section secondary to adhesive disease  -Task sent to surgical scheduling to set patient up for bilateral salpingectomy at 6 weeks postpartum      Munira Mcmanus is a 24 y o  female who presents to the clinic 1 weeks status post RLTCS  Eating a regular diet without difficulty  Bowel movements are normal  Pain is controlled without any medications  Patient is doing well, minimal pain, reports that everything is going well at home  Patient is breastfeeding without difficulty  Not sexually active  Patient desires permanent sterilization  Due to adhesive disease, tubal could not be performed at time of   Will task surgery scheduling about setting it up  The following portions of the patient's history were reviewed and updated as appropriate: allergies, current medications, past family history, past medical history, past social history, past surgical history and problem list     Review of Systems  Pertinent items are noted in HPI        Objective     LMP 2017 (Exact Date) Comment: Two positive tests at home  General:  alert and oriented, in no acute distress   Abdomen: soft, bowel sounds active, non-tender   Incision:   healing well, no drainage, no erythema, no hernia, no seroma, no swelling, no dehiscence, incision well approximated

## 2018-03-30 LAB — PLACENTA IN STORAGE: NORMAL

## 2018-04-26 PROBLEM — Z64.1 MULTIPARITY: Status: ACTIVE | Noted: 2018-04-26

## 2018-05-09 DIAGNOSIS — Z30.2 REQUEST FOR STERILIZATION: Primary | ICD-10-CM

## 2018-05-10 ENCOUNTER — TELEPHONE (OUTPATIENT)
Dept: OBGYN CLINIC | Facility: HOSPITAL | Age: 28
End: 2018-05-10

## 2018-05-10 NOTE — TELEPHONE ENCOUNTER
----- Message from Paris Contreras sent at 5/9/2018  4:51 PM EDT -----  Pt has Hgb of 8 0 on 3/16/18 with no repeat CBC  She needs a repeat CBC to be done before her bilateral tubal ligation, which is on 6/1/18  Order placed, please notify pt

## 2018-05-14 ENCOUNTER — TELEPHONE (OUTPATIENT)
Dept: OBGYN CLINIC | Facility: HOSPITAL | Age: 28
End: 2018-05-14

## 2018-05-14 ENCOUNTER — APPOINTMENT (OUTPATIENT)
Dept: LAB | Facility: HOSPITAL | Age: 28
End: 2018-05-14
Payer: COMMERCIAL

## 2018-05-14 DIAGNOSIS — Z30.2 REQUEST FOR STERILIZATION: ICD-10-CM

## 2018-05-14 LAB
BASOPHILS # BLD AUTO: 0.04 THOUSANDS/ΜL (ref 0–0.1)
BASOPHILS NFR BLD AUTO: 1 % (ref 0–1)
EOSINOPHIL # BLD AUTO: 0.08 THOUSAND/ΜL (ref 0–0.61)
EOSINOPHIL NFR BLD AUTO: 1 % (ref 0–6)
ERYTHROCYTE [DISTWIDTH] IN BLOOD BY AUTOMATED COUNT: 22.9 % (ref 11.6–15.1)
HCT VFR BLD AUTO: 35.7 % (ref 34.8–46.1)
HGB BLD-MCNC: 10.7 G/DL (ref 11.5–15.4)
LYMPHOCYTES # BLD AUTO: 3.15 THOUSANDS/ΜL (ref 0.6–4.47)
LYMPHOCYTES NFR BLD AUTO: 37 % (ref 14–44)
MCH RBC QN AUTO: 20.8 PG (ref 26.8–34.3)
MCHC RBC AUTO-ENTMCNC: 30 G/DL (ref 31.4–37.4)
MCV RBC AUTO: 69 FL (ref 82–98)
MONOCYTES # BLD AUTO: 0.44 THOUSAND/ΜL (ref 0.17–1.22)
MONOCYTES NFR BLD AUTO: 5 % (ref 4–12)
NEUTROPHILS # BLD AUTO: 4.77 THOUSANDS/ΜL (ref 1.85–7.62)
NEUTS SEG NFR BLD AUTO: 56 % (ref 43–75)
NRBC BLD AUTO-RTO: 0 /100 WBCS
PLATELET # BLD AUTO: 319 THOUSANDS/UL (ref 149–390)
PMV BLD AUTO: 9.7 FL (ref 8.9–12.7)
RBC # BLD AUTO: 5.15 MILLION/UL (ref 3.81–5.12)
WBC # BLD AUTO: 8.48 THOUSAND/UL (ref 4.31–10.16)

## 2018-05-14 PROCEDURE — 36415 COLL VENOUS BLD VENIPUNCTURE: CPT

## 2018-05-14 PROCEDURE — 85025 COMPLETE CBC W/AUTO DIFF WBC: CPT

## 2018-05-14 NOTE — TELEPHONE ENCOUNTER
Spoke with pt regarding the need for repeat CBC prior to her surgery on June 1st  Pt aware the need for specimen, "agreed to go today for her labs"  Explained doctors would review her labs and we would call with results

## 2018-05-17 ENCOUNTER — OFFICE VISIT (OUTPATIENT)
Dept: OBGYN CLINIC | Facility: HOSPITAL | Age: 28
End: 2018-05-17
Payer: COMMERCIAL

## 2018-05-17 VITALS
HEART RATE: 92 BPM | WEIGHT: 165.6 LBS | DIASTOLIC BLOOD PRESSURE: 70 MMHG | BODY MASS INDEX: 33.38 KG/M2 | RESPIRATION RATE: 16 BRPM | HEIGHT: 59 IN | SYSTOLIC BLOOD PRESSURE: 114 MMHG

## 2018-05-17 DIAGNOSIS — Z01.812 PRE-PROCEDURE LAB EXAM: Primary | ICD-10-CM

## 2018-05-17 PROBLEM — Z98.890 POST-OPERATIVE STATE: Status: RESOLVED | Noted: 2018-03-28 | Resolved: 2018-05-17

## 2018-05-17 PROBLEM — O09.299 HX OF PRE-ECLAMPSIA IN PRIOR PREGNANCY, CURRENTLY PREGNANT: Status: RESOLVED | Noted: 2018-05-17 | Resolved: 2018-05-17

## 2018-05-17 PROBLEM — D50.8 IRON DEFICIENCY ANEMIA SECONDARY TO INADEQUATE DIETARY IRON INTAKE: Status: ACTIVE | Noted: 2018-05-17

## 2018-05-17 PROBLEM — Z64.1 MULTIPARITY: Status: RESOLVED | Noted: 2018-04-26 | Resolved: 2018-05-17

## 2018-05-17 PROBLEM — K21.9 GERD (GASTROESOPHAGEAL REFLUX DISEASE): Status: RESOLVED | Noted: 2018-05-17 | Resolved: 2018-05-17

## 2018-05-17 PROCEDURE — 99213 OFFICE O/P EST LOW 20 MIN: CPT | Performed by: OBSTETRICS & GYNECOLOGY

## 2018-05-18 NOTE — H&P
History & Physical - GYN Surgery  Estefanía Cain 24 y o  female MRN: [id number]  Unit/Bed#:  Encounter: [id number]    HPI:  Estefanía Cain is a 24 y o  [id number] on no current contraception presenting for surgical H&P for bilateral tubal ligation  Patient is s/p LTCS on 3/15/2018 where she desired intraop BTL that was not able to be completed secondary to extensive adhesive disease at time of surgery  Her postoperative course was significant for anemia with Hg of 8 0  More recent Hg on 5/14/18 was 10 7  She denies any complaints today and says she continues to do well with her baby  She is still breast feeding  Alternatives, risks and benefits to bilateral tubal ligation were discussed in detail  Alternative contraceptive treatments including LARCs, Depo Provera, OCPs, and vasectomy were discussed and patient is 100% sure she would like a bilateral salpingectomy as her form of contraception  Patient understands that irrespective of life changes such as a new partner, tragic loss of a child, etc that this is a permanent and irreversible procedure  Risks of infection, bleeding, injury to surrounding structures such as bladder, bowel, uterus, ureters and vasculature, increased risk of injury secondary to her adhesive disease, failure to perform procedure, anesthesia complications, shock and possible death were all discussed  Procedure was reviewed with patient and all questions were answered  Patient is agreeable to proceed with bilateral tubal ligation  Reviewed that patient should not eat or drink anything after midnight the night before surgery  Discussed pain management with motrin and tylenol post-op  Patient to return 2 weeks post procedure for follow up visit      Active Problems:  Patient Active Problem List   Diagnosis    Asthma    Iron deficiency anemia secondary to inadequate dietary iron intake     PMH:  Past Medical History:   Diagnosis Date    Asthma     Ear ache     GERD (gastroesophageal reflux disease)     Hx of pre-eclampsia in prior pregnancy, currently pregnant     Severe preeclampsia      PSH:  Past Surgical History:   Procedure Laterality Date     SECTION      [place]  DELIVERY ONLY N/A 3/15/2018    Procedure:  SECTION () REPEAT;  Surgeon: George Lazaro MD;  Location: [facility];  Service: Obstetrics     Social Hx:  Denies tobacco use, alcohol use or illicit drug use  OB Hx: Patient is a [id number]  Obstetric History       T3      L3     SAB1   TAB0   Ectopic0   Multiple0   Live Births3       # Outcome Date GA Lbr Ralph/2nd Weight Sex Delivery Anes PTL Lv   5 Term 03/15/18 39w6d / 01:58 3430 g (7 lb 9 oz) M CS-LTranv EPI  DANICA      Name: Chinedu Moore  (Nessa Tanner)      Complications: Failure to Progress in Second Stage,Maternal exhaustion complicating labor and delivery      Apgar1:  8                Apgar5: 9   4 SAB /16           3 Term 13 40w0d  3118 g (6 lb 14 oz) M   N DANICA   2 Term 11 40w0d  3459 g (7 lb 10 oz) M CS-LTranv  N DANICA      Complications: Preeclampsia   1                    Meds:  Current Outpatient Prescriptions on File Prior to Visit   Medication Sig Dispense Refill    albuterol (PROVENTIL HFA,VENTOLIN HFA) 90 mcg/act inhaler Inhale 2 puffs every 6 (six) hours as needed for wheezing      Prenatal Vit-Fe Fumarate-FA (PNV PRENATAL PLUS MULTIVITAMIN) 27-1 MG TABS Take 1 tablet by mouth daily      [DISCONTINUED] famotidine (PEPCID) 10 mg tablet Take 1 tablet by mouth daily as needed       No current facility-administered medications on file prior to visit  Allergies:  No Known Allergies    Physical Exam:  /70 (BP Location: Left arm, Patient Position: Sitting, Cuff Size: Standard)   Pulse 92   Resp 16   Ht 4' 11" (1 499 m)   Wt 75 1 kg (165 lb 9 6 oz)   BMI 33 45 kg/m²     Physical Exam   Constitutional: She is oriented to person, place, and time  She appears well-developed and well-nourished   No distress  Obese   HENT:   Head: Normocephalic and atraumatic  Neck: Normal range of motion  Neck supple  Cardiovascular: Normal rate, regular rhythm and normal heart sounds  Exam reveals no gallop and no friction rub  No murmur heard  Pulmonary/Chest: Effort normal and breath sounds normal  No respiratory distress  She has no wheezes  She has no rales  Abdominal: Soft  There is no tenderness  There is no rebound and no guarding  Well healed  scar   Neurological: She is alert and oriented to person, place, and time  Skin: Skin is warm and dry  She is not diaphoretic  Psychiatric: She has a normal mood and affect  Her behavior is normal    Vitals reviewed      Assessment/ Plan  24 y o  [id number] for bilateral tubal ligation scheduled on 2018  - CBC, type and screen and UPT pre procedure  - Chlorhexidine body wash and instructions given    Patient consented with Dr Ranulfo Valderrama

## 2018-05-31 NOTE — PRE-PROCEDURE INSTRUCTIONS
Pre-Surgery Instructions:   Medication Instructions    albuterol (PROVENTIL HFA,VENTOLIN HFA) 90 mcg/act inhaler Instructed patient per Anesthesia Guidelines  REVIEWED  PRINTED SURGICAL INSTRUCTIONS WITH PATIENT , PATIENT VERBALIZED UNDERSTANDING   MEDICATIONS REVIEWED  Patient currently does not have an inhaler  Needs new prescription

## 2018-06-01 ENCOUNTER — HOSPITAL ENCOUNTER (OUTPATIENT)
Facility: HOSPITAL | Age: 28
Setting detail: OUTPATIENT SURGERY
Discharge: HOME | End: 2018-06-01
Attending: OBSTETRICS & GYNECOLOGY | Admitting: OBSTETRICS & GYNECOLOGY
Payer: COMMERCIAL

## 2018-06-01 ENCOUNTER — ANESTHESIA EVENT (OUTPATIENT)
Dept: PERIOP | Facility: HOSPITAL | Age: 28
End: 2018-06-01
Payer: COMMERCIAL

## 2018-06-01 ENCOUNTER — ANESTHESIA (OUTPATIENT)
Dept: PERIOP | Facility: HOSPITAL | Age: 28
End: 2018-06-01
Payer: COMMERCIAL

## 2018-06-01 VITALS
HEIGHT: 57 IN | TEMPERATURE: 99 F | RESPIRATION RATE: 18 BRPM | BODY MASS INDEX: 35.6 KG/M2 | WEIGHT: 165 LBS | HEART RATE: 88 BPM | DIASTOLIC BLOOD PRESSURE: 54 MMHG | SYSTOLIC BLOOD PRESSURE: 110 MMHG | OXYGEN SATURATION: 97 %

## 2018-06-01 DIAGNOSIS — Z64.1 MULTIPARITY: ICD-10-CM

## 2018-06-01 DIAGNOSIS — Z30.2 ENCOUNTER FOR FEMALE STERILIZATION PROCEDURE: Primary | ICD-10-CM

## 2018-06-01 PROBLEM — N73.6 PELVIC ADHESIVE DISEASE: Status: ACTIVE | Noted: 2018-06-01

## 2018-06-01 PROBLEM — Z90.79 STATUS POST BILATERAL SALPINGECTOMY: Status: ACTIVE | Noted: 2018-06-01

## 2018-06-01 LAB
ABO GROUP BLD: NORMAL
BASOPHILS # BLD AUTO: 0.05 THOUSANDS/ΜL (ref 0–0.1)
BASOPHILS NFR BLD AUTO: 1 % (ref 0–1)
BLD GP AB SCN SERPL QL: NEGATIVE
EOSINOPHIL # BLD AUTO: 0.05 THOUSAND/ΜL (ref 0–0.61)
EOSINOPHIL NFR BLD AUTO: 1 % (ref 0–6)
ERYTHROCYTE [DISTWIDTH] IN BLOOD BY AUTOMATED COUNT: 21.5 % (ref 11.6–15.1)
EXT PREGNANCY TEST URINE: NEGATIVE
HCT VFR BLD AUTO: 36.2 % (ref 34.8–46.1)
HGB BLD-MCNC: 10.2 G/DL (ref 11.5–15.4)
IMM GRANULOCYTES # BLD AUTO: 0.03 THOUSAND/UL (ref 0–0.2)
IMM GRANULOCYTES NFR BLD AUTO: 0 % (ref 0–2)
LYMPHOCYTES # BLD AUTO: 2.4 THOUSANDS/ΜL (ref 0.6–4.47)
LYMPHOCYTES NFR BLD AUTO: 31 % (ref 14–44)
MCH RBC QN AUTO: 20.3 PG (ref 26.8–34.3)
MCHC RBC AUTO-ENTMCNC: 28.2 G/DL (ref 31.4–37.4)
MCV RBC AUTO: 72 FL (ref 82–98)
MONOCYTES # BLD AUTO: 0.53 THOUSAND/ΜL (ref 0.17–1.22)
MONOCYTES NFR BLD AUTO: 7 % (ref 4–12)
NEUTROPHILS # BLD AUTO: 4.76 THOUSANDS/ΜL (ref 1.85–7.62)
NEUTS SEG NFR BLD AUTO: 60 % (ref 43–75)
NRBC BLD AUTO-RTO: 0 /100 WBCS
PLATELET # BLD AUTO: 310 THOUSANDS/UL (ref 149–390)
PMV BLD AUTO: 9.6 FL (ref 8.9–12.7)
RBC # BLD AUTO: 5.02 MILLION/UL (ref 3.81–5.12)
RH BLD: POSITIVE
SPECIMEN EXPIRATION DATE: NORMAL
WBC # BLD AUTO: 7.82 THOUSAND/UL (ref 4.31–10.16)

## 2018-06-01 PROCEDURE — 81025 URINE PREGNANCY TEST: CPT | Performed by: OBSTETRICS & GYNECOLOGY

## 2018-06-01 PROCEDURE — 86901 BLOOD TYPING SEROLOGIC RH(D): CPT | Performed by: OBSTETRICS & GYNECOLOGY

## 2018-06-01 PROCEDURE — 88302 TISSUE EXAM BY PATHOLOGIST: CPT | Performed by: PATHOLOGY

## 2018-06-01 PROCEDURE — 85025 COMPLETE CBC W/AUTO DIFF WBC: CPT | Performed by: STUDENT IN AN ORGANIZED HEALTH CARE EDUCATION/TRAINING PROGRAM

## 2018-06-01 PROCEDURE — 86850 RBC ANTIBODY SCREEN: CPT | Performed by: OBSTETRICS & GYNECOLOGY

## 2018-06-01 PROCEDURE — 58661 LAPAROSCOPY REMOVE ADNEXA: CPT | Performed by: OBSTETRICS & GYNECOLOGY

## 2018-06-01 PROCEDURE — 86900 BLOOD TYPING SEROLOGIC ABO: CPT | Performed by: OBSTETRICS & GYNECOLOGY

## 2018-06-01 RX ORDER — OXYCODONE HYDROCHLORIDE AND ACETAMINOPHEN 5; 325 MG/1; MG/1
1 TABLET ORAL EVERY 4 HOURS PRN
Status: DISCONTINUED | OUTPATIENT
Start: 2018-06-01 | End: 2018-06-01 | Stop reason: HOSPADM

## 2018-06-01 RX ORDER — ONDANSETRON 2 MG/ML
4 INJECTION INTRAMUSCULAR; INTRAVENOUS ONCE AS NEEDED
Status: DISCONTINUED | OUTPATIENT
Start: 2018-06-01 | End: 2018-06-01 | Stop reason: HOSPADM

## 2018-06-01 RX ORDER — IBUPROFEN 600 MG/1
600 TABLET ORAL EVERY 6 HOURS PRN
Qty: 30 TABLET | Refills: 0
Start: 2018-06-01

## 2018-06-01 RX ORDER — GLYCOPYRROLATE 0.2 MG/ML
INJECTION INTRAMUSCULAR; INTRAVENOUS AS NEEDED
Status: DISCONTINUED | OUTPATIENT
Start: 2018-06-01 | End: 2018-06-01 | Stop reason: SURG

## 2018-06-01 RX ORDER — OXYCODONE HYDROCHLORIDE 5 MG/1
5 TABLET ORAL EVERY 4 HOURS PRN
Qty: 10 TABLET | Refills: 0 | Status: SHIPPED | OUTPATIENT
Start: 2018-06-01 | End: 2018-06-11

## 2018-06-01 RX ORDER — ALBUTEROL SULFATE 90 UG/1
2 AEROSOL, METERED RESPIRATORY (INHALATION) EVERY 6 HOURS PRN
Status: DISCONTINUED | OUTPATIENT
Start: 2018-06-01 | End: 2018-06-01 | Stop reason: HOSPADM

## 2018-06-01 RX ORDER — MIDAZOLAM HYDROCHLORIDE 1 MG/ML
INJECTION INTRAMUSCULAR; INTRAVENOUS AS NEEDED
Status: DISCONTINUED | OUTPATIENT
Start: 2018-06-01 | End: 2018-06-01 | Stop reason: SURG

## 2018-06-01 RX ORDER — IBUPROFEN 600 MG/1
600 TABLET ORAL EVERY 6 HOURS PRN
Status: DISCONTINUED | OUTPATIENT
Start: 2018-06-01 | End: 2018-06-01 | Stop reason: HOSPADM

## 2018-06-01 RX ORDER — LIDOCAINE HYDROCHLORIDE 10 MG/ML
INJECTION, SOLUTION EPIDURAL; INFILTRATION; INTRACAUDAL; PERINEURAL AS NEEDED
Status: DISCONTINUED | OUTPATIENT
Start: 2018-06-01 | End: 2018-06-01 | Stop reason: HOSPADM

## 2018-06-01 RX ORDER — METOCLOPRAMIDE HYDROCHLORIDE 5 MG/ML
10 INJECTION INTRAMUSCULAR; INTRAVENOUS ONCE AS NEEDED
Status: DISCONTINUED | OUTPATIENT
Start: 2018-06-01 | End: 2018-06-01 | Stop reason: HOSPADM

## 2018-06-01 RX ORDER — OXYCODONE HYDROCHLORIDE AND ACETAMINOPHEN 5; 325 MG/1; MG/1
2 TABLET ORAL EVERY 4 HOURS PRN
Status: DISCONTINUED | OUTPATIENT
Start: 2018-06-01 | End: 2018-06-01 | Stop reason: HOSPADM

## 2018-06-01 RX ORDER — ONDANSETRON 2 MG/ML
INJECTION INTRAMUSCULAR; INTRAVENOUS AS NEEDED
Status: DISCONTINUED | OUTPATIENT
Start: 2018-06-01 | End: 2018-06-01 | Stop reason: SURG

## 2018-06-01 RX ORDER — SODIUM CHLORIDE, SODIUM LACTATE, POTASSIUM CHLORIDE, CALCIUM CHLORIDE 600; 310; 30; 20 MG/100ML; MG/100ML; MG/100ML; MG/100ML
125 INJECTION, SOLUTION INTRAVENOUS CONTINUOUS
Status: DISCONTINUED | OUTPATIENT
Start: 2018-06-01 | End: 2018-06-01 | Stop reason: HOSPADM

## 2018-06-01 RX ORDER — LIDOCAINE HYDROCHLORIDE 10 MG/ML
INJECTION, SOLUTION INFILTRATION; PERINEURAL AS NEEDED
Status: DISCONTINUED | OUTPATIENT
Start: 2018-06-01 | End: 2018-06-01 | Stop reason: SURG

## 2018-06-01 RX ORDER — PROPOFOL 10 MG/ML
INJECTION, EMULSION INTRAVENOUS AS NEEDED
Status: DISCONTINUED | OUTPATIENT
Start: 2018-06-01 | End: 2018-06-01 | Stop reason: SURG

## 2018-06-01 RX ORDER — KETOROLAC TROMETHAMINE 30 MG/ML
INJECTION, SOLUTION INTRAMUSCULAR; INTRAVENOUS AS NEEDED
Status: DISCONTINUED | OUTPATIENT
Start: 2018-06-01 | End: 2018-06-01 | Stop reason: SURG

## 2018-06-01 RX ORDER — FENTANYL CITRATE 50 UG/ML
INJECTION, SOLUTION INTRAMUSCULAR; INTRAVENOUS AS NEEDED
Status: DISCONTINUED | OUTPATIENT
Start: 2018-06-01 | End: 2018-06-01 | Stop reason: SURG

## 2018-06-01 RX ORDER — ROCURONIUM BROMIDE 10 MG/ML
INJECTION, SOLUTION INTRAVENOUS AS NEEDED
Status: DISCONTINUED | OUTPATIENT
Start: 2018-06-01 | End: 2018-06-01 | Stop reason: SURG

## 2018-06-01 RX ORDER — FENTANYL CITRATE/PF 50 MCG/ML
25 SYRINGE (ML) INJECTION
Status: DISCONTINUED | OUTPATIENT
Start: 2018-06-01 | End: 2018-06-01 | Stop reason: HOSPADM

## 2018-06-01 RX ADMIN — NEOSTIGMINE METHYLSULFATE 3 MG: 1 INJECTION, SOLUTION INTRAMUSCULAR; INTRAVENOUS; SUBCUTANEOUS at 12:45

## 2018-06-01 RX ADMIN — LIDOCAINE HYDROCHLORIDE 50 MG: 10 INJECTION, SOLUTION INFILTRATION; PERINEURAL at 12:03

## 2018-06-01 RX ADMIN — SODIUM CHLORIDE, SODIUM LACTATE, POTASSIUM CHLORIDE, AND CALCIUM CHLORIDE 125 ML/HR: .6; .31; .03; .02 INJECTION, SOLUTION INTRAVENOUS at 09:58

## 2018-06-01 RX ADMIN — MIDAZOLAM 2 MG: 1 INJECTION INTRAMUSCULAR; INTRAVENOUS at 12:01

## 2018-06-01 RX ADMIN — PROPOFOL 170 MG: 10 INJECTION, EMULSION INTRAVENOUS at 12:03

## 2018-06-01 RX ADMIN — HYDROMORPHONE HYDROCHLORIDE 0.5 MG: 1 INJECTION, SOLUTION INTRAMUSCULAR; INTRAVENOUS; SUBCUTANEOUS at 12:30

## 2018-06-01 RX ADMIN — GLYCOPYRROLATE 0.4 MG: 0.2 INJECTION, SOLUTION INTRAMUSCULAR; INTRAVENOUS at 12:45

## 2018-06-01 RX ADMIN — KETOROLAC TROMETHAMINE 15 MG: 30 INJECTION, SOLUTION INTRAMUSCULAR at 12:42

## 2018-06-01 RX ADMIN — ONDANSETRON 4 MG: 2 INJECTION INTRAMUSCULAR; INTRAVENOUS at 12:40

## 2018-06-01 RX ADMIN — DEXAMETHASONE SODIUM PHOSPHATE 10 MG: 10 INJECTION INTRAMUSCULAR; INTRAVENOUS at 12:08

## 2018-06-01 RX ADMIN — FENTANYL CITRATE 100 MCG: 50 INJECTION, SOLUTION INTRAMUSCULAR; INTRAVENOUS at 12:03

## 2018-06-01 RX ADMIN — ROCURONIUM BROMIDE 30 MG: 10 INJECTION INTRAVENOUS at 12:04

## 2018-06-01 RX ADMIN — SODIUM CHLORIDE, SODIUM LACTATE, POTASSIUM CHLORIDE, AND CALCIUM CHLORIDE: .6; .31; .03; .02 INJECTION, SOLUTION INTRAVENOUS at 11:59

## 2018-06-01 NOTE — DISCHARGE INSTRUCTIONS
Gynecology Discharge Instructions  1  No heavy lifting more than one full gallon of milk (about 8 lbs)  2  Nothing in the vagina for 6-8 weeks  3  No tub baths or swimming  4  You may take stairs one at a time, touching each step with both feet for the first few days, then as tolerated  5  Call the office for fever greater than 100 4, heavy vaginal bleeding or increasing pain  6  Take Colace 1 tablet twice daily to keep your stool soft  7  No Driving until pain free and no longer requiring narcotic pain medications  8  Activity as tolerated        Laparoscopic Tubal Ligation   WHAT YOU SHOULD KNOW:   A laparoscopic tubal ligation is surgery to close your fallopian tubes to prevent pregnancy  AFTER YOU LEAVE:   Medicines:   · Acetaminophen  decreases pain and fever  It is available without a doctor's order  Ask your primary healthcare provider [facility]) how much to take and how often to take it  Follow directions  Acetaminophen can cause liver damage if not taken correctly  · NSAIDs  help decrease swelling, pain, and fever  This medicine is available without a doctor's order  NSAIDs can cause stomach bleeding or kidney problems  If you take blood thinner medicine, always ask your PHP if NSAIDs are safe for you  Always read the medicine label and follow directions  · Prescription pain medicine  may be given  Ask your PHP how to take this medicine safely  · Antibiotics  help treat or prevent a bacterial infection  · Take your medicine as directed  Contact your PHP if you think your medicine is not helping or if you have side effects  Tell him if you are allergic to any medicine  Keep a list of the medicines, vitamins, and herbs you take  Include the amounts, and when and why you take them  Bring the list or the pill bottles to follow-up visits  Carry your medicine list with you in case of an emergency    Follow up with your surgeon or gynecologist as directed:  Write down your questions so you remember to ask them during your visits  Activity:  You may feel like resting more after surgery  Slowly start to do more each day  Rest when you feel it is needed  Ask when you can return to your daily activities  Contact your surgeon or gynecologist if:   · You have a fever  · Your incisions come apart  · You have heavy bleeding from your vagina or your incisions  · You have trouble urinating, burning when you urinate, or bloody urine  · Your incision is red, swollen, or has pus or foul-smelling drainage coming from it  · You have pain that gets worse instead of better, or that is not controlled with your medicine  · Your skin is itchy, swollen, or has a rash  · You are vomiting and are not able to keep food or fluids down for over 24 hours  · You have questions or concerns about your condition or care  Seek care immediately or call 911 if:   · You have sudden shortness of breath or chest pain  · You have heavy vaginal bleeding that fills 1 or more sanitary pads each hour for 4 hours in a row  © 2014 [address] is for End User's use only and may not be sold, redistributed or otherwise used for commercial purposes  All illustrations and images included in CareNotes® are the copyrighted property of A [employer] M [employer]  or Zach Caceres  The above information is an  only  It is not intended as medical advice for individual conditions or treatments  Talk to your doctor, nurse or pharmacist before following any medical regimen to see if it is safe and effective for you

## 2018-06-01 NOTE — INTERVAL H&P NOTE
H&P reviewed  After examining the patient I find no changes in the patients condition since the H&P had been written 
H&P reviewed  After examining the patient I find no changes in the patients condition since the H&P had been written 
Myself

## 2018-06-01 NOTE — OP NOTE
OPERATIVE REPORT  PATIENT NAME: Anna Cortez    :  1990  MRN: [id number]  Pt Location: [facility] OR ROOM 05    SURGERY DATE: 2018    Surgeon(s) and Role:     * Dangelo Pryor MD - Primary     * Carmelina Galindo MD     * Noreen Burnette MD - Assisting    Preop Diagnosis:  Multiparity [Z64 1]    Post-Op Diagnosis Codes:     * Multiparity [Z64 1]    Procedure(s) (LRB):  LIGATION TUBAL LAPAROSCOPIC (Bilateral)  LYSIS ADHESIONS (N/A)    Specimen(s):  ID Type Source Tests Collected by Time Destination   1 :  Tissue Fallopian Tubes, Bilateral TISSUE EXAM Dangelo Pryor MD 2018 1252        Estimated Blood Loss:   Minimal    Drains:  NG/OG/Enteral Tube Orogastric 18 Fr Center mouth (Active)   Number of days: 0       Anesthesia Type:   General endotracheal    Operative Indications:  Desire for female sterilization procedures    Operative Findings:  1  Extremely anteriorly displaced cervix which was not palpable unless displaced caudally by abdominal pressure  2  Dense anterior adhesions to anterior abdominal wall and anterior uterus  3  Uterus adherent to anterior abdominal wall  4  Prominent corpus luteum cyst noted on right ovary and small cyst on left ovary  5  Normal right fallopian tube and proximal fallopian tube densely adhered to pelvic side wall    Complications:   None    Brief History  23 yo [id number] s/p CS x2 with dense adhesions noted with inability to complete tubal ligation during last  section  All risks, benefits, and alternatives to the procedure were discussed with the patient and she had the opportunity to ask questions  The risk of regret of procedure was also addressed with the patient and options for LARC was discussed  Increased risk of surgical injury secondary to adhesions were discussed with patient  Patient expressed desire to continue with tubal sterilization  Informed consent was obtained       Description of Procedure    Patient was taken to the operating room where a time out was performed to confirm correct patient and correct procedure  General endotracheal anesthesia (GET) was administered and the patient was positioned on the OR table in the dorsal lithotomy position  All pressure points were padded and a sunny hugger was placed to maintain control of core body temperature  A bimanual exam was performed and the uterus was noted to be anteverted and non-mobile  Cervix was only palpable with caudal abdominal pressure as it was extremely anteriorly displaced  The patient was prepped and draped in the usual sterile fashion with chloroprep on the abdomen and betadine prep on the vagina and perineum  Operative Technique    A straight catheter was introduced into the bladder, which was drained of 200 cc of clear yellow urine  A sponge stick was placed in the vagina as the cervix was unable to be easily visualized  Sterile gloves were then exchanged and attention was turned to the abdomen  A 5mm incision was made at Simms's point  Trocar was introduced under direct visualization  Pneumoperitoneum was then established to a maximum of 15mmHg  The entire abdomen and pelvis was inspected and there was no evidence of injury to bowel, bladder, vasculature, or other structures  Attention was then turned to the pelvis  Patient was placed in Trendelenburg and dense anterior adhesions were noted to the anterior abdominal wall and anterior uterus  The uterus was noted to be adherent to the anterior abdominal wall  A prominent corpus luteum cysts are noted on the right ovary with a smaller cyst on the left ovary  The right fallopian tube appeared normal with the left fallopian tube densely adhered to the pelvic sidewall proximally  Two additional port sites were selected in the left and right lower abdomen approximately 2cm superior and medial to the iliac crests  A 5mm incision was made for introduction of a 5mm trocar under direct visualization at each site      The Harmonic was advanced into the pelvis and the omental adhesions were lysed in order to appropriately visualize anatomy  The right fallopian tube was grasped at its fimbriated end with a blunt grasper and elevated to visualize the mesosalpinx  The Harmonic device was used to ligate along the mesosalpinx, working proximally and taking care to avoid ovarian vasculature  Approximately 2cm from the cornua, the Harmonic was used to amputate fallopian tube  This was then withdrawn from the abdominal cavity and sent for pathology  Attention was then turned to the contralateral tube  The fimbria were grasped and fimbriectomy was performed along with a tubal ligation on the left fallopian tube  Good hemostasis was confirmed following salpingectomy  Following salpingectomy, pneumoperitoneum was allowed to escape  Adequate hemostasis was visualized  The inferior trocars were removed under direct visualization  The laparoscope was withdrawn from the abdomen, followed by its trocar sleeve at the umbilicus  Skin incisions were closed with running absorbable suture of 4-0 monocryl  Incisions were infiltrated with 3 cc 0 25 Marcaine  Histoacryl was placed sterilely a top incisions  Attention was turned to the vagina  Sponge stick was removed from vagina  At the conclusion of the procedure, all needle, sponge, and instrument counts were noted to be correct x2  Patient tolerated the procedure well and was transferred to PACU in stable condition prior to discharge with follow up in 1-2 weeks  Dr Makenzie Florian and Dr Everton Singh were present and participated in all key portions of the case        Patient Disposition:  PACU     SIGNATURE: Sully Monsivais MD  DATE: June 1, 2018  TIME: 1:16 PM

## 2018-06-01 NOTE — H&P (VIEW-ONLY)
History & Physical - GYN Surgery  Darrell Pressley 24 y o  female MRN: [id number]  Unit/Bed#:  Encounter: [id number]    HPI:  Darrell Pressley is a 24 y o  [id number] on no current contraception presenting for surgical H&P for bilateral tubal ligation  Patient is s/p LTCS on 3/15/2018 where she desired intraop BTL that was not able to be completed secondary to extensive adhesive disease at time of surgery  Her postoperative course was significant for anemia with Hg of 8 0  More recent Hg on 5/14/18 was 10 7  She denies any complaints today and says she continues to do well with her baby  She is still breast feeding  Alternatives, risks and benefits to bilateral tubal ligation were discussed in detail  Alternative contraceptive treatments including LARCs, Depo Provera, OCPs, and vasectomy were discussed and patient is 100% sure she would like a bilateral salpingectomy as her form of contraception  Patient understands that irrespective of life changes such as a new partner, tragic loss of a child, etc that this is a permanent and irreversible procedure  Risks of infection, bleeding, injury to surrounding structures such as bladder, bowel, uterus, ureters and vasculature, increased risk of injury secondary to her adhesive disease, failure to perform procedure, anesthesia complications, shock and possible death were all discussed  Procedure was reviewed with patient and all questions were answered  Patient is agreeable to proceed with bilateral tubal ligation  Reviewed that patient should not eat or drink anything after midnight the night before surgery  Discussed pain management with motrin and tylenol post-op  Patient to return 2 weeks post procedure for follow up visit      Active Problems:  Patient Active Problem List   Diagnosis    Asthma    Iron deficiency anemia secondary to inadequate dietary iron intake     PMH:  Past Medical History:   Diagnosis Date    Asthma     Ear ache     GERD (gastroesophageal reflux disease)     Hx of pre-eclampsia in prior pregnancy, currently pregnant     Severe preeclampsia      PSH:  Past Surgical History:   Procedure Laterality Date     SECTION      MO  DELIVERY ONLY N/A 3/15/2018    Procedure:  SECTION () REPEAT;  Surgeon: Idamae Schilder, MD;  Location: [facility];  Service: Obstetrics     Social Hx:  Denies tobacco use, alcohol use or illicit drug use  OB Hx: Patient is a [id number]  Obstetric History       T3      L3     SAB1   TAB0   Ectopic0   Multiple0   Live Births3       # Outcome Date GA Lbr Ralph/2nd Weight Sex Delivery Anes PTL Lv   5 Term 03/15/18 39w6d / 01:58 3430 g (7 lb 9 oz) M CS-LTranv EPI  DANICA      Name: Niraj Lewis  (Marilu Camachoari)      Complications: Failure to Progress in Second Stage,Maternal exhaustion complicating labor and delivery      Apgar1:  8                Apgar5: 9   4 SAB /16           3 Term 13 40w0d  3118 g (6 lb 14 oz) M   N DANICA   2 Term 11 40w0d  3459 g (7 lb 10 oz) M CS-LTranv  N DANICA      Complications: Preeclampsia   1                    Meds:  Current Outpatient Prescriptions on File Prior to Visit   Medication Sig Dispense Refill    albuterol (PROVENTIL HFA,VENTOLIN HFA) 90 mcg/act inhaler Inhale 2 puffs every 6 (six) hours as needed for wheezing      Prenatal Vit-Fe Fumarate-FA (PNV PRENATAL PLUS MULTIVITAMIN) 27-1 MG TABS Take 1 tablet by mouth daily      [DISCONTINUED] famotidine (PEPCID) 10 mg tablet Take 1 tablet by mouth daily as needed       No current facility-administered medications on file prior to visit  Allergies:  No Known Allergies    Physical Exam:  /70 (BP Location: Left arm, Patient Position: Sitting, Cuff Size: Standard)   Pulse 92   Resp 16   Ht 4' 11" (1 499 m)   Wt 75 1 kg (165 lb 9 6 oz)   BMI 33 45 kg/m²     Physical Exam   Constitutional: She is oriented to person, place, and time  She appears well-developed and well-nourished   No distress  Obese   HENT:   Head: Normocephalic and atraumatic  Neck: Normal range of motion  Neck supple  Cardiovascular: Normal rate, regular rhythm and normal heart sounds  Exam reveals no gallop and no friction rub  No murmur heard  Pulmonary/Chest: Effort normal and breath sounds normal  No respiratory distress  She has no wheezes  She has no rales  Abdominal: Soft  There is no tenderness  There is no rebound and no guarding  Well healed  scar   Neurological: She is alert and oriented to person, place, and time  Skin: Skin is warm and dry  She is not diaphoretic  Psychiatric: She has a normal mood and affect  Her behavior is normal    Vitals reviewed      Assessment/ Plan  24 y o  [id number] for bilateral tubal ligation scheduled on 2018  - CBC, type and screen and UPT pre procedure  - Chlorhexidine body wash and instructions given    Patient consented with Dr Hima Burr

## 2018-06-01 NOTE — ANESTHESIA POSTPROCEDURE EVALUATION
Post-Op Assessment Note      CV Status:  Stable    Mental Status:  Alert and awake    Hydration Status:  Euvolemic    PONV Controlled:  Controlled    Airway Patency:  Patent    Post Op Vitals Reviewed: Yes          Staff: CRNA           BP   105/50   Temp      Pulse  64   Resp   18   SpO2 100

## 2018-06-01 NOTE — ANESTHESIA PREPROCEDURE EVALUATION
Review of Systems/Medical History  Patient summary reviewed  Chart reviewed      Cardiovascular  Hypertension controlled,    Pulmonary  Asthma , PRN med  controlled ,        GI/Hepatic    GERD well controlled,             Endo/Other     GYN       Hematology   Musculoskeletal       Neurology   Psychology           Physical Exam    Airway    Mallampati score: II  TM Distance: >3 FB  Neck ROM: full     Dental   No notable dental hx     Cardiovascular  Rhythm: regular, Rate: normal, Cardiovascular exam normal    Pulmonary  Pulmonary exam normal Breath sounds clear to auscultation,     Other Findings        Anesthesia Plan  ASA Score- 2     Anesthesia Type- general with ASA Monitors  Additional Monitors:   Airway Plan: ETT  Plan Factors-    Induction- intravenous  Postoperative Plan- Plan for postoperative opioid use  Informed Consent- Anesthetic plan and risks discussed with patient  I personally reviewed this patient with the CRNA  Discussed and agreed on the Anesthesia Plan with the CRNA  Addi Vincent

## 2018-06-20 ENCOUNTER — OFFICE VISIT (OUTPATIENT)
Dept: OBGYN CLINIC | Facility: HOSPITAL | Age: 28
End: 2018-06-20
Payer: COMMERCIAL

## 2018-06-20 VITALS
WEIGHT: 167.4 LBS | HEIGHT: 57 IN | SYSTOLIC BLOOD PRESSURE: 119 MMHG | HEART RATE: 85 BPM | BODY MASS INDEX: 36.11 KG/M2 | DIASTOLIC BLOOD PRESSURE: 64 MMHG

## 2018-06-20 DIAGNOSIS — Z90.79 STATUS POST BILATERAL SALPINGECTOMY: Primary | ICD-10-CM

## 2018-06-20 PROBLEM — K21.9 GERD (GASTROESOPHAGEAL REFLUX DISEASE): Status: ACTIVE | Noted: 2017-08-24

## 2018-06-20 PROCEDURE — 99213 OFFICE O/P EST LOW 20 MIN: CPT | Performed by: OBSTETRICS & GYNECOLOGY

## 2018-06-20 NOTE — PROGRESS NOTES
Subjective:     Sarah May is a 24 y o  [id number] female here for postop check s/p bilateral salpingectomy on 6/1/2018  Patient is doing well  She reports that she had minimal postoperative pain  She was able to return to regular diet without nausea and vomiting and has had no problems with voiding or passing bowel movements  LMP 6/2/2018  She denies fever, chills, SOB, chest pain, abdominopelvic pain  Objective:    Vitals: Blood pressure 119/64, pulse 85, height 4' 8 5" (1 435 m), weight 75 9 kg (167 lb 6 4 oz), last menstrual period 06/02/2018, currently breastfeeding  Body mass index is 36 87 kg/m²  Physical Exam  Incision check: all incision sites clean, dry, intact without irritation or inflammation    Assessment/Plan:    1) Post op check s/p sterilization  Patient to call our office if she develops any new symptoms  RTC for annual visit        Megha Mejia MD  6/20/2018  3:53 PM

## 2018-08-23 ENCOUNTER — APPOINTMENT (EMERGENCY)
Dept: RADIOLOGY | Facility: HOSPITAL | Age: 28
End: 2018-08-23
Payer: COMMERCIAL

## 2018-08-23 ENCOUNTER — HOSPITAL ENCOUNTER (EMERGENCY)
Facility: HOSPITAL | Age: 28
Discharge: HOME | End: 2018-08-23
Attending: EMERGENCY MEDICINE | Admitting: EMERGENCY MEDICINE
Payer: COMMERCIAL

## 2018-08-23 VITALS
TEMPERATURE: 97.2 F | SYSTOLIC BLOOD PRESSURE: 140 MMHG | BODY MASS INDEX: 35.6 KG/M2 | HEIGHT: 57 IN | RESPIRATION RATE: 18 BRPM | DIASTOLIC BLOOD PRESSURE: 62 MMHG | HEART RATE: 71 BPM | WEIGHT: 165 LBS | OXYGEN SATURATION: 99 %

## 2018-08-23 DIAGNOSIS — M25.539 WRIST PAIN: Primary | ICD-10-CM

## 2018-08-23 PROCEDURE — 99283 EMERGENCY DEPT VISIT LOW MDM: CPT

## 2018-08-23 PROCEDURE — 73110 X-RAY EXAM OF WRIST: CPT

## 2018-08-23 NOTE — ED ATTENDING ATTESTATION
I, Mariely Gonzalez DO, saw and evaluated the patient  I have discussed the patient with the resident/non-physician practitioner and agree with the resident's/non-physician practitioner's findings, Plan of Care, and MDM as documented in the resident's/non-physician practitioner's note, except where noted  All available labs and Radiology studies were reviewed  At this point I agree with the current assessment done in the Emergency Department  I have conducted an independent evaluation of this patient a history and physical is as follows:      Critical Care Time  CritCare Time    Procedures     24 yr old fem with left wrist pain and palmar and finger numbness since Monday  Incr pain with motion  No prior hx of the same, overuse, repetative use, injury  Exm; volar wrist tender with + phalens  Xray normal   Pln: tx for carpal tunnel

## 2018-08-24 NOTE — DISCHARGE INSTRUCTIONS
Arthralgia   WHAT YOU NEED TO KNOW:   Arthralgia is pain in one or more joints, with no inflammation  It may be short-term and get better within 6 to 8 weeks  Arthralgia can be an early sign of arthritis  Arthralgia may be caused by a medical condition, such as a hormone disorder or a tumor  It may also be caused by an infection or injury  DISCHARGE INSTRUCTIONS:   Medicines: The following medicines may  be ordered for you:  · Acetaminophen  decreases pain  Ask how much to take and how often to take it  Follow directions  Acetaminophen can cause liver damage if not taken correctly  · NSAIDs  decrease pain and prevent swelling  Ask your healthcare provider which medicine is right for you  Ask how much to take and when to take it  Take as directed  NSAIDs can cause stomach bleeding and kidney problems if not taken correctly  · Pain relief cream  decreases pain  Use this cream as directed  · Take your medicine as directed  Contact your healthcare provider if you think your medicine is not helping or if you have side effects  Tell him of her if you are allergic to any medicine  Keep a list of the medicines, vitamins, and herbs you take  Include the amounts, and when and why you take them  Bring the list or the pill bottles to follow-up visits  Carry your medicine list with you in case of an emergency  Follow up with your healthcare provider or specialist as directed:  Write down your questions so you remember to ask them during your visits  Self-care:   · Apply heat  to help decrease pain  Use a heating pad or heat wrap  Apply heat for 20 to 30 minutes every 2 hours for as many days as directed  · Rest  as much as possible  Avoid activities that cause joint pain  · Apply ice  to help decrease swelling and pain  Ice may also help prevent tissue damage  Use an ice pack, or put crushed ice in a plastic bag   Cover it with a towel and place it on your painful joint for 15 to 20 minutes every hour or as directed  · Support  the joint with a brace or elastic wrap as directed  · Elevate  your joint above the level of your heart as often as you can to help decrease swelling and pain  Prop your painful joint on pillows or blankets to keep it elevated comfortably  · Lose weight  if you are overweight  Extra weight can put pressure on your joints and cause more pain  Ask your healthcare provider how much you should weigh  Ask him to help you create a weight loss plan  · Exercise  regularly to help improve joint movement and to decrease pain  Ask about the best exercise plan for you  Low-impact exercises can help take the pressure off your joints  Examples are walking, swimming, and water aerobics  Physical therapy:  A physical therapist teaches you exercises to help improve movement and strength, and to decrease pain  Ask your healthcare provider if physical therapy is right for you  Contact your healthcare provider or specialist if:   · You have a fever  · You continue to have joint pain that cannot be relieved with heat, ice, or medicine  · You have pain and inflammation around your joint  · You have questions or concerns about your condition or care  Return to the emergency department if:   · You have sudden, severe pain when you move your joint  · You have a fever and shaking chills  · You cannot move your joint  · You lose feeling on the side of your body where you have the painful joint  © 2017 [employer]  Information is for End User's use only and may not be sold, redistributed or otherwise used for commercial purposes  All illustrations and images included in CareNotes® are the copyrighted property of [employer]  or [employer]  The above information is an  only  It is not intended as medical advice for individual conditions or treatments   Talk to your doctor, nurse or pharmacist before following any medical regimen to see if it is safe and effective for you

## 2018-08-24 NOTE — ED PROVIDER NOTES
History  Chief Complaint   Patient presents with    Wrist Pain     Pt c/o left wrist pain since Monday  Pt denies injury to area  26-year-old female presents with left wrist pain  Patient states that she began to notice the pain on Monday morning, states that there was no specific trigger, no trauma  Patient states that the pain is worse on flexion of her wrist, states it is not as bad on wrist extension  Also has associated tingling in the palmar surface of her left hand  Denies any weakness or numbness  Patient denies any pain in any location in her left extremity, denies any pain in her other extremities  Has not had any swelling or erythema  Patient also denies any fever, chills fatigue, night sweats  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 Taking?    albuterol (PROVENTIL HFA,VENTOLIN HFA) 90 mcg/act inhaler Past Week at Unknown time  Yes Yes   Sig: Inhale 2 puffs every 6 (six) hours as needed for wheezing   ibuprofen (MOTRIN) 600 mg tablet   No No   Sig: Take 1 tablet (600 mg total) by mouth every 6 (six) hours as needed for mild pain      Facility-Administered Medications: None       Past Medical History:   Diagnosis Date    Asthma     Ear ache     GERD (gastroesophageal reflux disease)     H/O  section     within the past two months    Hx of pre-eclampsia in prior pregnancy, currently pregnant     Severe preeclampsia        Past Surgical History:   Procedure Laterality Date    ABDOMINAL ADHESION SURGERY N/A 2018    Procedure: LYSIS ADHESIONS;  Surgeon: Jerica Vieyra MD;  [facility];  Service: Gynecology     SECTION      HI  DELIVERY ONLY N/A 3/15/2018    Procedure:  SECTION () REPEAT;  Surgeon: Cristal Samuel MD;  Location: [facility] ;  Service: Obstetrics    HI LAP,TUBAL CAUTERY Bilateral 2018    Procedure: LIGATION TUBAL LAPAROSCOPIC;  Surgeon: Jerica Vieyra MD;  [facility];  Service: Gynecology       Family History   Problem Relation Age of Onset    Hypertension Mother     Osteoporosis Mother     Hypertension Father     Canavan disease Family     Diabetes Family     Heart disease Family      I have reviewed and agree with the history as documented  Social History   Substance Use Topics    Smoking status: Never Smoker    Smokeless tobacco: Never Used    Alcohol use No      Comment: per allscripts - social        Review of Systems    Physical Exam  ED Triage Vitals [18]   Temperature Pulse Respirations Blood Pressure SpO2   (!) 97 2 °F (36 2 °C) 71 18 140/62 99 %      Temp Source Heart Rate Source Patient Position - Orthostatic VS BP Location FiO2 (%)   Tympanic -- -- -- --      Pain Score       --           Orthostatic Vital Signs  Vitals:    18   BP: 140/62   Pulse: 71       Physical Exam   General: VSS, NAD, awake, alert  Well-nourished, well-developed  Appears stated age  Head: Normocephalic, atraumatic, nontender  Eyes: PERRL, EOM-I  No hyphema, No subconjunctival hemorrhages  Symmetrical lids  ENT: Atraumatic external nose and ears  Moist Mucous Membranes  No malocclusion  Neck: Symmetric, trachea midline  No JVD  No drooling  Normal swallowing  CV: RRR  +H7/W6, No murmurs, clicks, rubs, gallops  +2 peripheral pulses upper and lower bilaterally  No chest wall tenderness  No peripheral edema  Lungs: bilateral breath sounds, CTAB, no wheezing, rales, or stridor  No tachypnea   No retractions, Unlabored breathing,  Speaks in full sentences, normal phonation  Abd: +BS, soft, NT/ND, no rebound or guarding  MSK:  Tenderness to palpation proximal palmar surface hand, pain on wrist flexion, no erythema or effusion, no deformity, pain and tingling triggered with Phalen test, full range of motion in wrist, full range of motion distal of left hand, full range of motion of upper left extremity, full range of motion in neck and right upper extremity and lower extremities bilaterally   Back: No rashes  Skin: Dry, intact  Neuro: AAOx3, GCS 15, CN II-XII intact  5/5 motor and sensory in upper and lower extremities bilaterally  Psychiatric/Behavioral: Appropriate mood and affect   Exam: deferred        ED Medications  Medications - No data to display    Diagnostic Studies  Results Reviewed     None                 XR wrist 3+ views LEFT    (Results Pending)         Procedures  Procedures      Phone Consults  ED Phone Contact    ED Course                               MDM  Number of Diagnoses or Management Options  Wrist pain:   Diagnosis management comments: This is a 28-year-old well-appearing female presenting with wrist pain  Pain on flexion and tingling in the palmar surface of the hand is most consistent with carpal tunnel syndrome  X-rays negative in history suggests no significant trauma  Is reassuring that patient has full range of motion and her left extremity and does not any weakness or numbness, and has good distal pulses  Counseled patient to use wrist brace and to take ibuprofen for pain and to follow up with Orthopedics for further evaluation and management      CritCare Time    Disposition  Final diagnoses:   Wrist pain - Left     Time reflects when diagnosis was documented in both MDM as applicable and the Disposition within this note     Time User Action Codes Description Comment    8/23/2018  8:14 PM Zayda Doll Add [M86 539] Wrist pain     8/23/2018  8:15 PM Zayda Doll Modify [I69 539] Wrist pain Left      ED Disposition     ED Disposition Condition Comment    Discharge  Kaela Ross discharge to home/self care  Condition at discharge: Good        Follow-up Information     Follow up With Specialties Details Why [address] [facility] Schedule an appointment as soon as possible for a visit Wrist pain [address] [facility] [address] [facility]          Discharge Medication List as of 8/23/2018  8:18 PM      CONTINUE these medications which have NOT CHANGED    Details   albuterol (PROVENTIL HFA,VENTOLIN HFA) 90 mcg/act inhaler Inhale 2 puffs every 6 (six) hours as needed for wheezing, Until Discontinued, Historical Med      ibuprofen (MOTRIN) 600 mg tablet Take 1 tablet (600 mg total) by mouth every 6 (six) hours as needed for mild pain, Starting Fri 6/1/2018, No Print           No discharge procedures on file  ED Provider  Attending physically available and evaluated Kaela Ross I managed the patient along with the ED Attending      Electronically Signed by         Diego Camacho MD  08/23/18 6682

## 2020-04-08 NOTE — ANESTHESIA PROCEDURE NOTES
Epidural Block    Patient location during procedure: OB  Start time: 3/15/2018 11:44 AM  Reason for block: at surgeon's request and primary anesthetic  Staffing  Anesthesiologist: Justice Baldwin  Performed: anesthesiologist   Preanesthetic Checklist  Completed: patient identified, site marked, surgical consent, pre-op evaluation, timeout performed, IV checked, risks and benefits discussed and monitors and equipment checked  Epidural  Patient position: sitting  Prep: Betadine  Patient monitoring: frequent blood pressure checks  Approach: midline  Location: lumbar (1-5)  Injection technique: BRENNON saline  Needle  Needle type: Tuohy   Needle gauge: 18 G  Catheter type: side hole  Catheter size: 20 G  Test dose: negative  Assessment  Sensory level: [id number] aspiration for CSF, negative aspiration for heme and no paresthesia on injection  patient tolerated the procedure well with no immediate complications Additional Notes: Briefly disc dupixent Detail Level: Simple

## 2020-08-25 ENCOUNTER — HOSPITAL ENCOUNTER (EMERGENCY)
Facility: HOSPITAL | Age: 30
Discharge: HOME | End: 2020-08-25
Attending: EMERGENCY MEDICINE
Payer: COMMERCIAL

## 2020-08-25 VITALS
HEART RATE: 77 BPM | WEIGHT: 198 LBS | RESPIRATION RATE: 18 BRPM | BODY MASS INDEX: 43.61 KG/M2 | TEMPERATURE: 98.4 F | OXYGEN SATURATION: 98 % | SYSTOLIC BLOOD PRESSURE: 113 MMHG | DIASTOLIC BLOOD PRESSURE: 76 MMHG

## 2020-08-25 DIAGNOSIS — N39.0 UTI (URINARY TRACT INFECTION): Primary | ICD-10-CM

## 2020-08-25 LAB
BACTERIA UR QL AUTO: ABNORMAL /HPF
BILIRUB UR QL STRIP: NEGATIVE
CLARITY UR: ABNORMAL
COLOR UR: YELLOW
EXT PREG TEST URINE: NEGATIVE
EXT. CONTROL ED NAV: NORMAL
GLUCOSE UR STRIP-MCNC: NEGATIVE MG/DL
HGB UR QL STRIP.AUTO: ABNORMAL
KETONES UR STRIP-MCNC: NEGATIVE MG/DL
LEUKOCYTE ESTERASE UR QL STRIP: ABNORMAL
NITRITE UR QL STRIP: POSITIVE
NON-SQ EPI CELLS URNS QL MICRO: ABNORMAL /HPF
PH UR STRIP.AUTO: 7 [PH] (ref 4.5–8)
PROT UR STRIP-MCNC: ABNORMAL MG/DL
RBC #/AREA URNS AUTO: ABNORMAL /HPF
SP GR UR STRIP.AUTO: >=1.03 (ref 1–1.03)
UROBILINOGEN UR QL STRIP.AUTO: 1 E.U./DL
WBC #/AREA URNS AUTO: ABNORMAL /HPF

## 2020-08-25 PROCEDURE — 99284 EMERGENCY DEPT VISIT MOD MDM: CPT | Performed by: EMERGENCY MEDICINE

## 2020-08-25 PROCEDURE — 99283 EMERGENCY DEPT VISIT LOW MDM: CPT

## 2020-08-25 PROCEDURE — 87077 CULTURE AEROBIC IDENTIFY: CPT

## 2020-08-25 PROCEDURE — 87086 URINE CULTURE/COLONY COUNT: CPT

## 2020-08-25 PROCEDURE — 81001 URINALYSIS AUTO W/SCOPE: CPT

## 2020-08-25 PROCEDURE — 81025 URINE PREGNANCY TEST: CPT | Performed by: PHYSICIAN ASSISTANT

## 2020-08-25 PROCEDURE — 87186 SC STD MICRODIL/AGAR DIL: CPT

## 2020-08-25 RX ORDER — PHENAZOPYRIDINE HYDROCHLORIDE 200 MG/1
200 TABLET, FILM COATED ORAL 3 TIMES DAILY
Qty: 6 TABLET | Refills: 0 | Status: SHIPPED | OUTPATIENT
Start: 2020-08-25

## 2020-08-25 RX ORDER — CEPHALEXIN 500 MG/1
500 CAPSULE ORAL 2 TIMES DAILY
Qty: 20 CAPSULE | Refills: 0 | Status: SHIPPED | OUTPATIENT
Start: 2020-08-25 | End: 2020-09-04

## 2020-08-25 NOTE — DISCHARGE INSTRUCTIONS
DISCHARGE INSTRUCTIONS:    FOLLOW UP WITH YOUR PRIMARY CARE PROVIDER OR THE [address] [place] [address]  MAKE AN APPOINTMENT TO BE SEEN  TAKE MEDICATION AS PRESCRIBED  IF RASH, SHORTNESS OF BREATH OR TROUBLE SWALLOWING, STOP TAKING THE MEDICATION AND BE SEEN  REST AND DRINK PLENTY OF FLUIDS  IF SYMPTOMS WORSEN OR NEW SYMPTOMS ARISE, RETURN TO THE ER TO BE SEEN

## 2020-08-25 NOTE — ED PROVIDER NOTES
History  Chief Complaint   Patient presents with    Urinary Frequency     Reporting urinary frequency and burning with urination that started today  Denies any other sx       29y  o female with PMH of asthma, GERD and preeclampsia presents to the ER for urinary urgency, frequency and dysuria for 1 day  Patient denies taking any medication for symptoms  She describes her pain as burning  Symptoms are constant  She denies fever, chills, URI symptoms, chest pain, dyspnea, N/V/D, abdominal pain, vaginal discharge, weakness or paresthesias  History provided by:  Patient   used: No        Prior to Admission Medications   Prescriptions Last Dose Informant Patient Reported? Taking? albuterol (PROVENTIL HFA,VENTOLIN HFA) 90 mcg/act inhaler   Yes No   Sig: Inhale 2 puffs every 6 (six) hours as needed for wheezing   ibuprofen (MOTRIN) 600 mg tablet   No No   Sig: Take 1 tablet (600 mg total) by mouth every 6 (six) hours as needed for mild pain      Facility-Administered Medications: None       Past Medical History:   Diagnosis Date    Asthma     Ear ache     GERD (gastroesophageal reflux disease)     H/O  section     within the past two months    Hx of pre-eclampsia in prior pregnancy, currently pregnant     Severe preeclampsia        Past Surgical History:   Procedure Laterality Date    ABDOMINAL ADHESION SURGERY N/A 2018    Procedure: LYSIS ADHESIONS;  Surgeon: Elan Clay MD;  [facility];  Service: Gynecology     SECTION      HYSTERECTOMY      WI  DELIVERY ONLY N/A 3/15/2018    Procedure:  SECTION () REPEAT;  Surgeon:  Navdeep Diggs MD;  [facility];  Service: Obstetrics    WI LAP,TUBAL CAUTERY Bilateral 2018    Procedure: LIGATION TUBAL LAPAROSCOPIC;  Surgeon: Elan Clay MD;  [facility];  Service: Gynecology       Family History   Problem Relation Age of Onset    Hypertension Mother    [facility] Osteoporosis Mother     Hypertension Father     Canavan disease Family     Diabetes Family     Heart disease Family      I have reviewed and agree with the history as documented  E-Cigarette/Vaping    E-Cigarette Use Never User      E-Cigarette/Vaping Substances     Social History     Tobacco Use    Smoking status: Never Smoker    Smokeless tobacco: Never Used   Substance Use Topics    Alcohol use: No     Comment: per allscripts - social    Drug use: No       Review of Systems   Constitutional: Negative for activity change, appetite change, chills and fever  HENT: Negative for congestion, drooling, ear discharge, ear pain, facial swelling, rhinorrhea and sore throat  Eyes: Negative for redness  Respiratory: Negative for cough and shortness of breath  Cardiovascular: Negative for chest pain  Gastrointestinal: Negative for abdominal pain, diarrhea, nausea and vomiting  Genitourinary: Positive for dysuria, frequency and urgency  Negative for hematuria, vaginal bleeding and vaginal discharge  Musculoskeletal: Negative for neck stiffness  Skin: Negative for rash  Allergic/Immunologic: Negative for food allergies  Neurological: Negative for weakness and numbness  Physical Exam  Physical Exam  Vitals signs and nursing note reviewed  Constitutional:       General: She is not in acute distress  Appearance: She is not toxic-appearing  HENT:      Head: Normocephalic and atraumatic  Neck:      Musculoskeletal: Normal range of motion and neck supple  Trachea: No tracheal deviation  Cardiovascular:      Rate and Rhythm: Normal rate and regular rhythm  Heart sounds: Normal heart sounds, S1 normal and S2 normal  No murmur  No friction rub  No gallop  Pulmonary:      Effort: Pulmonary effort is normal  No respiratory distress  Breath sounds: Normal breath sounds  No decreased breath sounds, wheezing, rhonchi or rales  Chest:      Chest wall: No tenderness  Abdominal:      General: Bowel sounds are normal  There is no distension  Palpations: Abdomen is soft  Tenderness: There is no abdominal tenderness  There is no guarding or rebound  Skin:     General: Skin is warm and dry  Findings: No rash  Neurological:      Mental Status: She is alert  GCS: GCS eye subscore is 4  GCS verbal subscore is 5  GCS motor subscore is 6  Vital Signs  ED Triage Vitals [08/25/20 1043]   Temperature Pulse Respirations Blood Pressure SpO2   98 4 °F (36 9 °C) 77 18 113/76 98 %      Temp Source Heart Rate Source Patient Position - Orthostatic VS BP Location FiO2 (%)   Temporal Monitor Sitting Right arm --      Pain Score       --           Vitals:    08/25/20 1043   BP: 113/76   Pulse: 77   Patient Position - Orthostatic VS: Sitting         Visual Acuity      ED Medications  Medications - No data to display    Diagnostic Studies  Results Reviewed     Procedure Component Value Units Date/Time    Urine Microscopic [[id number]]  (Abnormal) Collected:  08/25/20 1126    Lab Status:  Final result Specimen:  Urine, Other Updated:  08/25/20 1204     RBC, UA 30-50 /hpf      WBC, UA Innumerable /hpf      Epithelial Cells Occasional /hpf      Bacteria, UA Occasional /hpf     Urine culture [[id number]] Collected:  08/25/20 1126    Lab Status:   In process Specimen:  Urine, Other Updated:  08/25/20 1204    POCT urinalysis dipstick [[id number]]  (Abnormal) Resulted:  08/25/20 1129    Lab Status:  Final result Specimen:  Urine Updated:  08/25/20 1129    POCT pregnancy, urine [[id number]]  (Normal) Resulted:  08/25/20 1129    Lab Status:  Final result Updated:  08/25/20 1129     EXT PREG TEST UR (Ref: Negative) negative     Control valid    Urine Macroscopic, POC [[id number]]  (Abnormal) Collected:  08/25/20 1126    Lab Status:  Final result Specimen:  Urine Updated:  08/25/20 1128     Color, UA Yellow     Clarity, UA Cloudy     pH, UA 7 0     Leukocytes, UA Large     Nitrite, UA Positive     Protein,  (2+) mg/dl      Glucose, UA Negative mg/dl      Ketones, UA Negative mg/dl      Urobilinogen, UA 1 0 E U /dl      Bilirubin, UA Negative     Blood, UA Moderate     Specific Gravity, UA >=1 030    Narrative:       CLINITEK RESULT                 No orders to display              Procedures  Procedures         ED Course                                             MDM  Number of Diagnoses or Management Options  UTI (urinary tract infection): new and requires workup  Diagnosis management comments: DDX consists of but not limited to: UTI, kidney stone, STD    Will check urine and pregnancy  Informed patient of lab findings  Will discharge  At discharge, I instructed the patient to:  -follow up with pcp  -take medication as prescribed  -rest and drink plenty of fluids  -return to the ER if symptoms worsened or new symptoms arose  Patient agreed to this plan and was stable at time of discharge  Amount and/or Complexity of Data Reviewed  Clinical lab tests: ordered and reviewed    Patient Progress  Patient progress: stable        Disposition  Final diagnoses:   UTI (urinary tract infection)     Time reflects when diagnosis was documented in both MDM as applicable and the Disposition within this note     Time User Action Codes Description Comment    8/25/2020 11:28 AM Kiley MEJIAS Add [N39 0] UTI (urinary tract infection)       ED Disposition     ED Disposition Condition Date/Time Comment    Discharge Stable Tue Aug 25, 2020 11:28 AM Zackary Kirkland discharge to home/self care              Follow-up Information     Follow up With Specialties Details Why Contact Info Additional [address] Family Medicine Schedule an appointment as soon as possible for a visit   [address] [facility] [address]  [address], [address], [address] [place], [place], [address] [phone]          Discharge Medication List as of 8/25/2020 11:30 AM      START taking these medications    Details   cephalexin (KEFLEX) 500 mg capsule Take 1 capsule (500 mg total) by mouth 2 (two) times a day for 10 days, Starting Tue 8/25/2020, Until Fri 9/4/2020, Normal      phenazopyridine (PYRIDIUM) 200 mg tablet Take 1 tablet (200 mg total) by mouth 3 (three) times a day, Starting Tue 8/25/2020, Normal         CONTINUE these medications which have NOT CHANGED    Details   albuterol (PROVENTIL HFA,VENTOLIN HFA) 90 mcg/act inhaler Inhale 2 puffs every 6 (six) hours as needed for wheezing, Until Discontinued, Historical Med      ibuprofen (MOTRIN) 600 mg tablet Take 1 tablet (600 mg total) by mouth every 6 (six) hours as needed for mild pain, Starting Fri 6/1/2018, No Print           No discharge procedures on file      PDMP Review     None          ED Provider  Electronically Signed by           Sergio Karimi PA-C  08/25/20 8640

## 2020-08-27 LAB — BACTERIA UR CULT: ABNORMAL

## 2021-10-13 NOTE — PROGRESS NOTES
Education  Baby & Me Education 1st Trimester:   First Trimester Education provided:   benefits of breastfeeding, importance of exclusive breastfeeding, early initiation of breastfeeding, exclusive breastfeeding for the first 6 months and Pregnancy Essentials Reference Guide given   The patient is planning on breastfeeding        Signatures   Electronically signed by : Ludwig Anna RN; Jul 24 2017  3:27PM EST                       (Author)
Abormal VS: Temp > 100F or < 96.8F; SBP < 90 mmHG; HR > 120bpm; Resp > 24/min

## 2024-03-26 ENCOUNTER — HOSPITAL ENCOUNTER (EMERGENCY)
Facility: HOSPITAL | Age: 34
Discharge: HOME | End: 2024-03-26
Attending: EMERGENCY MEDICINE
Payer: COMMERCIAL

## 2024-03-26 VITALS
WEIGHT: 137.35 LBS | SYSTOLIC BLOOD PRESSURE: 107 MMHG | DIASTOLIC BLOOD PRESSURE: 71 MMHG | TEMPERATURE: 98 F | RESPIRATION RATE: 18 BRPM | OXYGEN SATURATION: 98 % | BODY MASS INDEX: 30.25 KG/M2 | HEART RATE: 69 BPM

## 2024-03-26 DIAGNOSIS — J02.9 PHARYNGITIS: Primary | ICD-10-CM

## 2024-03-26 DIAGNOSIS — J02.0 STREP PHARYNGITIS: ICD-10-CM

## 2024-03-26 DIAGNOSIS — R09.81 NASAL CONGESTION: ICD-10-CM

## 2024-03-26 LAB — S PYO DNA THROAT QL NAA+PROBE: DETECTED

## 2024-03-26 PROCEDURE — 99282 EMERGENCY DEPT VISIT SF MDM: CPT

## 2024-03-26 PROCEDURE — 87651 STREP A DNA AMP PROBE: CPT | Performed by: EMERGENCY MEDICINE

## 2024-03-26 PROCEDURE — 99284 EMERGENCY DEPT VISIT MOD MDM: CPT | Performed by: EMERGENCY MEDICINE

## 2024-03-26 RX ORDER — AMOXICILLIN 500 MG/1
500 CAPSULE ORAL EVERY 12 HOURS SCHEDULED
Qty: 14 CAPSULE | Refills: 0 | Status: SHIPPED | OUTPATIENT
Start: 2024-03-26 | End: 2024-04-02

## 2024-03-26 RX ORDER — DEXAMETHASONE 4 MG/1
10 TABLET ORAL ONCE
Status: COMPLETED | OUTPATIENT
Start: 2024-03-26 | End: 2024-03-26

## 2024-03-26 RX ORDER — LIDOCAINE HYDROCHLORIDE 20 MG/ML
15 SOLUTION OROPHARYNGEAL ONCE
Status: COMPLETED | OUTPATIENT
Start: 2024-03-26 | End: 2024-03-26

## 2024-03-26 RX ORDER — KETOROLAC TROMETHAMINE 30 MG/ML
15 INJECTION, SOLUTION INTRAMUSCULAR; INTRAVENOUS ONCE
Status: DISCONTINUED | OUTPATIENT
Start: 2024-03-26 | End: 2024-03-26 | Stop reason: HOSPADM

## 2024-03-26 RX ADMIN — LIDOCAINE HYDROCHLORIDE 15 ML: 20 SOLUTION ORAL at 12:35

## 2024-03-26 RX ADMIN — DEXAMETHASONE 10 MG: 4 TABLET ORAL at 12:35

## 2024-03-26 NOTE — ED PROVIDER NOTES
History  Chief Complaint   Patient presents with    Sore Throat     Pt reports sore throat that started today around 0100. Pt was able to drink water but states she feels swollen. Redness present, airway in tact.      33 y.o. F p/w sore throat x today.  Hurts to eat/drink. Having nasal congestion.  Took Tylenol. Reports she's a , so many sick contacts.       History provided by:  Patient   used: No    Sore Throat  Associated symptoms: rhinorrhea    Associated symptoms: no abdominal pain, no chills, no cough, no fever and no headaches        Prior to Admission Medications   Prescriptions Last Dose Informant Patient Reported? Taking?   albuterol (PROVENTIL HFA,VENTOLIN HFA) 90 mcg/act inhaler   Yes No   Sig: Inhale 2 puffs every 6 (six) hours as needed for wheezing   ibuprofen (MOTRIN) 600 mg tablet   No No   Sig: Take 1 tablet (600 mg total) by mouth every 6 (six) hours as needed for mild pain   phenazopyridine (PYRIDIUM) 200 mg tablet   No No   Sig: Take 1 tablet (200 mg total) by mouth 3 (three) times a day      Facility-Administered Medications: None       Past Medical History:   Diagnosis Date    Asthma     Ear ache     GERD (gastroesophageal reflux disease)     H/O  section     within the past two months    Hx of pre-eclampsia in prior pregnancy, currently pregnant     Severe preeclampsia        Past Surgical History:   Procedure Laterality Date    ABDOMINAL ADHESION SURGERY N/A 2018    Procedure: LYSIS ADHESIONS;  Surgeon: Ila Chau MD;  Location:  MAIN OR;  Service: Gynecology    BARIATRIC SURGERY       SECTION      HYSTERECTOMY      DE  DELIVERY ONLY N/A 03/15/2018    Procedure:  SECTION () REPEAT;  Surgeon: Kristian Mccall MD;  Location: [facility];  Service: Obstetrics    DE LAPAROSCOPY FULGURATION OVIDUCTS Bilateral 2018    Procedure: LIGATION TUBAL LAPAROSCOPIC;  Surgeon: Ila Chau MD;   Location[facility];  Service: Gynecology       Family History   Problem Relation Age of Onset    Hypertension Mother     Osteoporosis Mother     Hypertension Father     Canavan disease Family     Diabetes Family     Heart disease Family      I have reviewed and agree with the history as documented.    E-Cigarette/Vaping    E-Cigarette Use Never User      E-Cigarette/Vaping Substances    Nicotine No     THC No     CBD No     Flavoring No     Other No     Unknown No      Social History     Tobacco Use    Smoking status: Never    Smokeless tobacco: Never   Vaping Use    Vaping status: Never Used   Substance Use Topics    Alcohol use: No     Comment: per allscripts - social    Drug use: No       Review of Systems   Constitutional:  Negative for chills and fever.   HENT:  Positive for congestion, rhinorrhea and sore throat.    Respiratory:  Negative for cough.    Gastrointestinal:  Negative for abdominal pain, diarrhea, nausea and vomiting.   Musculoskeletal:  Negative for myalgias.   Neurological:  Negative for headaches.       Physical Exam  Physical Exam  Vitals and nursing note reviewed.   Constitutional:       General: She is not in acute distress.     Appearance: She is well-developed. She is not ill-appearing, toxic-appearing or diaphoretic.   HENT:      Head: Normocephalic and atraumatic.      Right Ear: Tympanic membrane normal. No drainage. No middle ear effusion. Tympanic membrane is not injected, erythematous or bulging.      Left Ear: Tympanic membrane normal. No drainage.  No middle ear effusion. Tympanic membrane is not injected, erythematous or bulging.      Nose: Nose normal.      Mouth/Throat:      Pharynx: Oropharynx is clear. Uvula midline. Posterior oropharyngeal erythema present. No pharyngeal swelling, oropharyngeal exudate or uvula swelling.      Tonsils: No tonsillar exudate or tonsillar abscesses.   Eyes:      General:         Right eye: No discharge.         Left eye: No discharge.       Conjunctiva/sclera: Conjunctivae normal.      Right eye: Right conjunctiva is not injected.      Left eye: Left conjunctiva is not injected.   Neck:      Trachea: Trachea and phonation normal.   Cardiovascular:      Rate and Rhythm: Normal rate and regular rhythm.      Heart sounds: Normal heart sounds. No murmur heard.     No friction rub.   Pulmonary:      Effort: Pulmonary effort is normal. No accessory muscle usage or respiratory distress.      Breath sounds: Normal breath sounds. No stridor. No wheezing, rhonchi or rales.   Abdominal:      General: There is no distension.      Palpations: Abdomen is soft.      Tenderness: There is no abdominal tenderness. There is no guarding or rebound.   Musculoskeletal:      Cervical back: Normal range of motion. No rigidity.   Lymphadenopathy:      Cervical: No cervical adenopathy.   Skin:     General: Skin is warm and dry.      Coloration: Skin is not pale.      Findings: No rash.   Neurological:      Mental Status: She is alert.      GCS: GCS eye subscore is 4. GCS verbal subscore is 5. GCS motor subscore is 6.   Psychiatric:         Behavior: Behavior is cooperative.         Vital Signs  ED Triage Vitals [03/26/24 1204]   Temperature Pulse Respirations Blood Pressure SpO2   98 °F (36.7 °C) 69 18 107/71 98 %      Temp Source Heart Rate Source Patient Position - Orthostatic VS BP Location FiO2 (%)   Oral Monitor Sitting Right arm --      Pain Score       7           Vitals:    03/26/24 1204   BP: 107/71   Pulse: 69   Patient Position - Orthostatic VS: Sitting         Visual Acuity      ED Medications  Medications   ketorolac (TORADOL) injection 15 mg (15 mg Intramuscular Not Given 3/26/24 1239)   dexamethasone (DECADRON) tablet 10 mg (10 mg Oral Given 3/26/24 1235)   Lidocaine Viscous HCl (XYLOCAINE) 2 % mucosal solution 15 mL (15 mL Swish & Spit Given 3/26/24 1235)       Diagnostic Studies  Results Reviewed       Procedure Component Value Units Date/Time    Strep A PCR  [[id number]] Collected: 03/26/24 1239    Lab Status: In process Specimen: Throat Updated: 03/26/24 1244                   No orders to display              Procedures  Procedures         ED Course  ED Course as of 03/26/24 1251   Tue Mar 26, 2024   1248 Pt refused Toradol.  Pt does not want to stay for results.                                               Medical Decision Making  Sore throat - Will send strep test to r/o strep pharyngitis and give symptomatic tx.    Amount and/or Complexity of Data Reviewed  Labs: ordered.    Risk  Prescription drug management.             Disposition  Final diagnoses:   Pharyngitis   Nasal congestion     Time reflects when diagnosis was documented in both MDM as applicable and the Disposition within this note       Time User Action Codes Description Comment    3/26/2024 12:48 PM Vanessa Gray [J02.9] Pharyngitis     3/26/2024 12:48 PM Vanessa Gray [R09.81] Nasal congestion           ED Disposition       ED Disposition   Discharge    Condition   Stable    Date/Time   Tue Mar 26, 2024 12:48 PM    Comment   Marilee Andersen discharge to home/self care.                   Follow-up Information    None         Patient's Medications   Discharge Prescriptions    No medications on file       No discharge procedures on file.    PDMP Review       None            ED Provider  Electronically Signed by             Vanessa Gray DO  03/26/24 1251

## 2024-04-20 ENCOUNTER — HOSPITAL ENCOUNTER (EMERGENCY)
Facility: HOSPITAL | Age: 34
Discharge: HOME | End: 2024-04-20
Attending: EMERGENCY MEDICINE
Payer: COMMERCIAL

## 2024-04-20 VITALS
DIASTOLIC BLOOD PRESSURE: 53 MMHG | BODY MASS INDEX: 29.96 KG/M2 | SYSTOLIC BLOOD PRESSURE: 93 MMHG | RESPIRATION RATE: 18 BRPM | OXYGEN SATURATION: 97 % | WEIGHT: 136.02 LBS | TEMPERATURE: 97.8 F | HEART RATE: 66 BPM

## 2024-04-20 DIAGNOSIS — J02.0 STREP PHARYNGITIS: Primary | ICD-10-CM

## 2024-04-20 PROCEDURE — 99282 EMERGENCY DEPT VISIT SF MDM: CPT

## 2024-04-20 PROCEDURE — 99284 EMERGENCY DEPT VISIT MOD MDM: CPT | Performed by: EMERGENCY MEDICINE

## 2024-04-20 RX ORDER — LACTOBACILLUS ACIDOPH-L.BULGARICUS 1 MILLION CELL CHEWABLE TABLET 1MM CELL
1 TABLET,CHEWABLE ORAL
Qty: 21 TABLET | Refills: 0 | Status: SHIPPED | OUTPATIENT
Start: 2024-04-20 | End: 2024-04-27

## 2024-04-20 RX ORDER — CEPHALEXIN 250 MG/1
500 CAPSULE ORAL ONCE
Status: COMPLETED | OUTPATIENT
Start: 2024-04-20 | End: 2024-04-20

## 2024-04-20 RX ORDER — CEPHALEXIN 500 MG/1
500 CAPSULE ORAL EVERY 12 HOURS SCHEDULED
Qty: 20 CAPSULE | Refills: 0 | Status: SHIPPED | OUTPATIENT
Start: 2024-04-20 | End: 2024-04-30

## 2024-04-20 RX ORDER — KETOROLAC TROMETHAMINE 30 MG/ML
30 INJECTION, SOLUTION INTRAMUSCULAR; INTRAVENOUS ONCE
Status: DISCONTINUED | OUTPATIENT
Start: 2024-04-20 | End: 2024-04-21 | Stop reason: HOSPADM

## 2024-04-20 RX ADMIN — CEPHALEXIN 500 MG: 250 CAPSULE ORAL at 22:27

## 2024-04-20 RX ADMIN — DEXAMETHASONE SODIUM PHOSPHATE 10 MG: 10 INJECTION, SOLUTION INTRAMUSCULAR; INTRAVENOUS at 22:27

## 2024-04-21 NOTE — ED PROVIDER NOTES
History  Chief Complaint   Patient presents with    Sore Throat     With throat pain since this morning     Patient is a 33-year-old female coming in today for repeat strep throat.  She states that she was just here several weeks ago and had completed her full course of amoxicillin.  She states that she keeps getting her strep infections.  She has no ear pain and felt fevers.  She took Tylenol with minimal relief.  She has no ear pain, chest pain, cough or abdominal pain.  She has never had surgery and no recent surgery or dental work.      History provided by:  Patient and medical records   used: No    Sore Throat  Location:  Generalized  Quality:  Sharp and sore  Severity:  Moderate  Onset quality:  Gradual  Timing:  Constant  Progression:  Unchanged  Chronicity:  Recurrent  Relieved by:  Nothing  Worsened by:  Nothing  Ineffective treatments:  Acetaminophen  Associated symptoms: adenopathy    Associated symptoms: no abdominal pain, no chest pain, no chills, no cough, no drooling, no ear discharge, no ear pain, no epistaxis, no eye discharge, no fever, no headaches, no neck stiffness, no night sweats, no plugged ear sensation, no postnasal drip, no rash, no rhinorrhea, no shortness of breath, no sinus congestion, no stridor, no trouble swallowing and no voice change    Risk factors: no exposure to strep, no exposure to mono, no sick contacts, no recent dental procedure, no recent endoscopy and no recent ENT procedure        Prior to Admission Medications   Prescriptions Last Dose Informant Patient Reported? Taking?   albuterol (PROVENTIL HFA,VENTOLIN HFA) 90 mcg/act inhaler   Yes No   Sig: Inhale 2 puffs every 6 (six) hours as needed for wheezing   ibuprofen (MOTRIN) 600 mg tablet   No No   Sig: Take 1 tablet (600 mg total) by mouth every 6 (six) hours as needed for mild pain   phenazopyridine (PYRIDIUM) 200 mg tablet   No No   Sig: Take 1 tablet (200 mg total) by mouth 3 (three) times a day       Facility-Administered Medications: None       Past Medical History:   Diagnosis Date    Asthma     Ear ache     GERD (gastroesophageal reflux disease)     H/O  section     within the past two months    Hx of pre-eclampsia in prior pregnancy, currently pregnant     Severe preeclampsia        Past Surgical History:   Procedure Laterality Date    ABDOMINAL ADHESION SURGERY N/A 2018    Procedure: LYSIS ADHESIONS;  Surgeon: Ila Chau MD;  Location:  MAIN OR;  Service: Gynecology    BARIATRIC SURGERY       SECTION      HYSTERECTOMY      AK  DELIVERY ONLY N/A 03/15/2018    Procedure:  SECTION () REPEAT;  Surgeon: Kristian Mccall MD;  Location: [facility];  Service: Obstetrics    AK LAPAROSCOPY FULGURATION OVIDUCTS Bilateral 2018    Procedure: LIGATION TUBAL LAPAROSCOPIC;  Surgeon: Ila Chau MD;  Location:  MAIN OR;  Service: Gynecology       Family History   Problem Relation Age of Onset    Hypertension Mother     Osteoporosis Mother     Hypertension Father     Canavan disease Family     Diabetes Family     Heart disease Family      I have reviewed and agree with the history as documented.    E-Cigarette/Vaping    E-Cigarette Use Never User      E-Cigarette/Vaping Substances    Nicotine No     THC No     CBD No     Flavoring No     Other No     Unknown No      Social History     Tobacco Use    Smoking status: Never    Smokeless tobacco: Never   Vaping Use    Vaping status: Never Used   Substance Use Topics    Alcohol use: No     Comment: per allscripts - social    Drug use: No       Review of Systems   Constitutional: Negative.  Negative for chills, fever and night sweats.   HENT:  Positive for sore throat. Negative for drooling, ear discharge, ear pain, nosebleeds, postnasal drip, rhinorrhea, trouble swallowing and voice change.    Eyes: Negative.  Negative for pain, discharge and visual disturbance.   Respiratory: Negative.  Negative for  cough, shortness of breath and stridor.    Cardiovascular: Negative.  Negative for chest pain and palpitations.   Gastrointestinal: Negative.  Negative for abdominal pain and vomiting.   Genitourinary: Negative.  Negative for dysuria and hematuria.   Musculoskeletal: Negative.  Negative for arthralgias, back pain and neck stiffness.   Skin: Negative.  Negative for color change and rash.   Neurological: Negative.  Negative for seizures, syncope and headaches.   Hematological:  Positive for adenopathy.   Psychiatric/Behavioral: Negative.     All other systems reviewed and are negative.      Physical Exam  Physical Exam  Vitals and nursing note reviewed.   Constitutional:       General: She is not in acute distress.     Appearance: She is well-developed.   HENT:      Head: Normocephalic and atraumatic.      Mouth/Throat:      Mouth: Mucous membranes are moist.      Pharynx: Uvula midline.      Comments: Patient maintaining airway and secretions. No stridor . No brawniness under tongue.     Posterior pharyngeal erythema without any exudate.  Uvula midline without edema  Eyes:      General: Lids are normal. Vision grossly intact.      Extraocular Movements: Extraocular movements intact.      Conjunctiva/sclera: Conjunctivae normal.      Pupils: Pupils are equal, round, and reactive to light.   Neck:      Trachea: Trachea normal.      Comments: No meningeal signs  Cardiovascular:      Rate and Rhythm: Normal rate and regular rhythm.      Heart sounds: Normal heart sounds, S1 normal and S2 normal. No murmur heard.  Pulmonary:      Effort: Pulmonary effort is normal. No respiratory distress.      Breath sounds: Normal breath sounds.      Comments: No conversational dyspnea  Abdominal:      Palpations: Abdomen is soft.      Tenderness: There is no abdominal tenderness.   Musculoskeletal:         General: No swelling.      Cervical back: Neck supple.   Lymphadenopathy:      Cervical: Cervical adenopathy present.      Right  cervical: Superficial cervical adenopathy present.      Left cervical: Superficial cervical adenopathy present.   Skin:     General: Skin is warm and dry.      Capillary Refill: Capillary refill takes less than 2 seconds.   Neurological:      General: No focal deficit present.      Mental Status: She is alert and oriented to person, place, and time.      GCS: GCS eye subscore is 4. GCS verbal subscore is 5. GCS motor subscore is 6.      Cranial Nerves: Cranial nerves 2-12 are intact.      Sensory: Sensation is intact.      Motor: Motor function is intact.      Coordination: Coordination is intact.      Comments: No slurred speech.  No facial asymmetry.  No tongue deviation   Psychiatric:         Mood and Affect: Mood normal.         Behavior: Behavior normal.         Vital Signs  ED Triage Vitals [04/20/24 2200]   Temperature Pulse Respirations Blood Pressure SpO2   97.8 °F (36.6 °C) 66 18 93/53 97 %      Temp Source Heart Rate Source Patient Position - Orthostatic VS BP Location FiO2 (%)   Oral Monitor Sitting Right arm --      Pain Score       2           Vitals:    04/20/24 2200   BP: 93/53   Pulse: 66   Patient Position - Orthostatic VS: Sitting         Visual Acuity      ED Medications  Medications   ketorolac (TORADOL) injection 30 mg (has no administration in time range)   dexamethasone oral liquid 10 mg 1 mL (has no administration in time range)   cephalexin (KEFLEX) capsule 500 mg (has no administration in time range)       Diagnostic Studies  Results Reviewed       None                   No orders to display              Procedures  Procedures         ED Course  ED Course as of 04/20/24 2222   Sat Apr 20, 2024   2215 Patient is a 33-year-old female coming in today with repeat sore throat.  On exam patient states that she finished her antibiotics as prescribed.  She does meet Cent[facility] criteria with objective fever, lymphadenopathy cough and exudate.  Patient just finished a course of amoxicillin.  Will change  "her antibiotics to cephalosporins.  Will also give Decadron and Toradol for symptomatic control and refer to otolaryngology      Disclosure: Voice to text software was used in the preparation of this document and could have resulted in translational errors.      Occasional wrong word or \"sound a like\" substitutions may have occurred due to the inherent limitations of voice recognition software.  Read the chart carefully and recognize, using context, where substitutions have occurred.       I have independently reviewed external records are available to me to the level of detail possible within the time constraints of my patient care responsibilities in the ED.          Disclosure: Voice to text software was used in the preparation of this document and could have resulted in translational errors.      Occasional wrong word or \"sound a like\" substitutions may have occurred due to the inherent limitations of voice recognition software.  Read the chart carefully and recognize, using context, where substitutions have occurred.       I have independently reviewed external records are available to me to the level of detail possible within the time constraints of my patient care responsibilities in the ED.                                                 Medical Decision Making      Differential includes but not limited to:  Viral pharyngitis, mono, retropharyngeal abscess, peritonsillar abscess, strep pharyngitis, Chuckie's angina, other bacterial causes such as but not limited to (Neisseria, pertussis, Chlamydia, mycoplasma, syphilis); neoplasm, GERD, allergies, chemical injury, fungal infection, mastoiditis, sinusitis, hand-foot-mouth disease..        Aced on history and physical consistent with strep pharyngitis.    Problems Addressed:  Strep pharyngitis:     Details: Recurrent    Amount and/or Complexity of Data Reviewed  External Data Reviewed: notes.     Details: Patient was strep positive from March 26, 2024         "     Disposition  Final diagnoses:   Strep pharyngitis     Time reflects when diagnosis was documented in both MDM as applicable and the Disposition within this note       Time User Action Codes Description Comment    4/20/2024 10:18 PM Romana Keller [J02.0] Strep pharyngitis           ED Disposition       ED Disposition   Discharge    Condition   Stable    Date/Time   Sat Apr 20, 2024 10:18 PM    Comment   Marilee RAFIQ Rossos discharge to home/self care.                   Follow-up Information       Follow up With Specialties Details Why Contact Info Additional Information    [facility] Family Medicine Schedule an appointment as soon as possible for a visit in 1 week  [address] [facility], [address] [address] [phone] [facility], [address] [place] [address], [facility] [address], [place], [place], [address] [phone]    [facility] Otolaryngology Schedule an appointment as soon as possible for a visit in 3 days  [address]  [address]  [facility] [address] [phone] [facility], [address] [address]    [facility] Ear[facility] Nose[facility] Th[facility] Otolaryngology Schedule an appointment as soon as possible for a visit in 1 week  [address] [facility] [address]     Rc Parker MD Otolaryngology Schedule an appointment as soon as possible for a visit in 1 week  [address] [facility] [address] [phone]               Patient's Medications   Discharge Prescriptions    CEPHALEXIN (KEFLEX) 500 MG CAPSULE    Take 1 capsule (500 mg total) by mouth every 12 (twelve) hours for 10 days       Start Date: 4/20/2024 End Date: 4/30/2024       Order Dose: 500 mg       Quantity: 20 capsule    Refills: 0    LACTOBACILLUS ACIDOPHILUS-BULGARICUS (LACTINEX) CHEWABLE TABLET    Chew 1 tablet 3 (three) times a day with meals for 7 days       Start Date:  4/20/2024 End Date: 4/27/2024       Order Dose: 1 tablet       Quantity: 21 tablet    Refills: 0           PDMP Review       None            ED Provider  Electronically Signed by             Romana Keller DO  04/20/24 5151

## 2024-04-21 NOTE — DISCHARGE INSTRUCTIONS
Please alternate Tylenol and Motrin every 6-8 hours for fever and pain control  Please complete full course of antibiotics  To help prevent yeast infection, eat a yogurt a day or take over-the-counter acidophilus while on antibiotics  4.  Please call ENT for close follow-up

## 2024-05-07 ENCOUNTER — HOSPITAL ENCOUNTER (EMERGENCY)
Facility: HOSPITAL | Age: 34
Discharge: HOME | End: 2024-05-07
Attending: EMERGENCY MEDICINE
Payer: COMMERCIAL

## 2024-05-07 VITALS
DIASTOLIC BLOOD PRESSURE: 60 MMHG | HEART RATE: 79 BPM | BODY MASS INDEX: 29.57 KG/M2 | RESPIRATION RATE: 18 BRPM | WEIGHT: 134.26 LBS | SYSTOLIC BLOOD PRESSURE: 108 MMHG | OXYGEN SATURATION: 98 % | TEMPERATURE: 98 F

## 2024-05-07 DIAGNOSIS — J02.9 PHARYNGITIS: Primary | ICD-10-CM

## 2024-05-07 LAB — S PYO DNA THROAT QL NAA+PROBE: NOT DETECTED

## 2024-05-07 PROCEDURE — 99283 EMERGENCY DEPT VISIT LOW MDM: CPT

## 2024-05-07 PROCEDURE — 87651 STREP A DNA AMP PROBE: CPT

## 2024-05-07 PROCEDURE — 99282 EMERGENCY DEPT VISIT SF MDM: CPT

## 2024-05-07 NOTE — DISCHARGE INSTRUCTIONS
We will call with any positive results.  If you test positive for strep, we will send an antibiotic to the pharmacy if negative, this may be from a virus and should be expected to resolve on its own.  If not improving on its own over the span of the next week you should follow-up with PCP or urgent care for reevaluation.

## 2024-05-08 NOTE — ED PROVIDER NOTES
History  Chief Complaint   Patient presents with    Sore Throat     Pt thinks she has strep throat for the third time. Can't get into ent til nov     33-year-old female with PMH of asthma presents for evaluation of sore throat x 1 day.  Patient states she has had strep throat 2 times already in the past couple months.  Has an appointment with ENT in November due to her recurrent infections.  Has not had any recent known sick contacts.  States she took all precautions such as changing her toothbrush, washing all drink containers, etc.  Took all of her antibiotic doses and did initially have improvement in her symptoms.  Denies any fevers, chills, cough, vomiting.        Prior to Admission Medications   Prescriptions Last Dose Informant Patient Reported? Taking?   albuterol (PROVENTIL HFA,VENTOLIN HFA) 90 mcg/act inhaler   Yes No   Sig: Inhale 2 puffs every 6 (six) hours as needed for wheezing   ibuprofen (MOTRIN) 600 mg tablet   No No   Sig: Take 1 tablet (600 mg total) by mouth every 6 (six) hours as needed for mild pain   phenazopyridine (PYRIDIUM) 200 mg tablet   No No   Sig: Take 1 tablet (200 mg total) by mouth 3 (three) times a day      Facility-Administered Medications: None       Past Medical History:   Diagnosis Date    Asthma     Ear ache     GERD (gastroesophageal reflux disease)     H/O  section     within the past two months    Hx of pre-eclampsia in prior pregnancy, currently pregnant     Severe preeclampsia        Past Surgical History:   Procedure Laterality Date    ABDOMINAL ADHESION SURGERY N/A 2018    Procedure: LYSIS ADHESIONS;  Surgeon: Ila Chau MD;  Location:  MAIN OR;  Service: Gynecology    BARIATRIC SURGERY       SECTION      HYSTERECTOMY      MN  DELIVERY ONLY N/A 03/15/2018    Procedure:  SECTION () REPEAT;  Surgeon: Kristian Mccall MD;  Location: [facility];  Service: Obstetrics    MN LAPAROSCOPY FULGURATION OVIDUCTS Bilateral  06/01/2018    Procedure: LIGATION TUBAL LAPAROSCOPIC;  Surgeon: Ila Chau MD;  Location: [facility];  Service: Gynecology       Family History   Problem Relation Age of Onset    Hypertension Mother     Osteoporosis Mother     Hypertension Father     Canavan disease Family     Diabetes Family     Heart disease Family      I have reviewed and agree with the history as documented.    E-Cigarette/Vaping    E-Cigarette Use Never User      E-Cigarette/Vaping Substances    Nicotine No     THC No     CBD No     Flavoring No     Other No     Unknown No      Social History     Tobacco Use    Smoking status: Never    Smokeless tobacco: Never   Vaping Use    Vaping status: Never Used   Substance Use Topics    Alcohol use: No     Comment: per allscripts - social    Drug use: No       Review of Systems   Constitutional:  Negative for chills and fever.   HENT:  Positive for sore throat. Negative for ear pain.    Eyes:  Negative for pain and visual disturbance.   Respiratory:  Negative for cough and shortness of breath.    Cardiovascular:  Negative for chest pain and palpitations.   Gastrointestinal:  Negative for abdominal pain and vomiting.   Genitourinary:  Negative for dysuria and hematuria.   Musculoskeletal:  Negative for arthralgias and back pain.   Skin:  Negative for color change and rash.   Neurological:  Negative for seizures and syncope.   All other systems reviewed and are negative.      Physical Exam  Physical Exam  Vitals and nursing note reviewed.   Constitutional:       General: She is not in acute distress.     Appearance: She is well-developed.   HENT:      Head: Normocephalic and atraumatic.   Eyes:      Conjunctiva/sclera: Conjunctivae normal.   Cardiovascular:      Rate and Rhythm: Normal rate and regular rhythm.      Heart sounds: No murmur heard.  Pulmonary:      Effort: Pulmonary effort is normal. No respiratory distress.      Breath sounds: Normal breath sounds.   Abdominal:      Palpations:  Abdomen is soft.      Tenderness: There is no abdominal tenderness.   Musculoskeletal:         General: No swelling.      Cervical back: Neck supple.   Skin:     General: Skin is warm and dry.      Capillary Refill: Capillary refill takes less than 2 seconds.   Neurological:      Mental Status: She is alert.   Psychiatric:         Mood and Affect: Mood normal.         Vital Signs  ED Triage Vitals [05/07/24 1823]   Temperature Pulse Respirations Blood Pressure SpO2   98 °F (36.7 °C) 79 18 108/60 98 %      Temp Source Heart Rate Source Patient Position - Orthostatic VS BP Location FiO2 (%)   Oral Monitor Sitting Right arm --      Pain Score       --           Vitals:    05/07/24 1823   BP: 108/60   Pulse: 79   Patient Position - Orthostatic VS: Sitting         Visual Acuity      ED Medications  Medications - No data to display    Diagnostic Studies  Results Reviewed       Procedure Component Value Units Date/Time    Strep A PCR [[id number]]  (Normal) Collected: 05/07/24 1910    Lab Status: Final result Specimen: Throat Updated: 05/07/24 1943     STREP A PCR Not Detected                   No orders to display              Procedures  Procedures         ED Course                               SBIRT 22yo+      Flowsheet Row Most Recent Value   Initial Alcohol Screen: US AUDIT-C     1. How often do you have a drink containing alcohol? 0 Filed at: 05/07/2024 1823   2. How many drinks containing alcohol do you have on a typical day you are drinking?  0 Filed at: 05/07/2024 1823   3b. FEMALE Any Age, or MALE 65+: How often do you have 4 or more drinks on one occassion? 0 Filed at: 05/07/2024 1823   Audit-C Score 0 Filed at: 05/07/2024 1823   GWEN: How many times in the past year have you...    Used an illegal drug or used a prescription medication for non-medical reasons? Never Filed at: 05/07/2024 1823                      Medical Decision Making  33-year-old female presents for evaluation of sore throat for 1 day.  Had  multiple strep infections last couple months.  Exam: no posterior oropharyngeal erythema, no gross swelling, no exudates; no cervical adenopathy; afebrile.  Workup: Rapid strep.    If strep positive, patient will need another antibiotic.  If negative the symptoms may be due to a virus rather than bacterial.  Patient agreeable to discharge pending results.  Educated on supportive care and expectations.  Recommend follow-up with ENT as scheduled. Patient expresses understanding of the condition, treatment plan, follow-up instructions, and return precautions.      Amount and/or Complexity of Data Reviewed  Labs: ordered.             Disposition  Final diagnoses:   Pharyngitis     Time reflects when diagnosis was documented in both MDM as applicable and the Disposition within this note       Time User Action Codes Description Comment    5/7/2024  7:22 PM Jarrett Moreno Add [J02.9] Pharyngitis           ED Disposition       ED Disposition   Discharge    Condition   Stable    Date/Time   Tue May 7, 2024 1922    Comment   Marilee Andersen discharge to home/self care.                   Follow-up Information    None         Discharge Medication List as of 5/7/2024  7:23 PM        CONTINUE these medications which have NOT CHANGED    Details   albuterol (PROVENTIL HFA,VENTOLIN HFA) 90 mcg/act inhaler Inhale 2 puffs every 6 (six) hours as needed for wheezing, Until Discontinued, Historical Med      ibuprofen (MOTRIN) 600 mg tablet Take 1 tablet (600 mg total) by mouth every 6 (six) hours as needed for mild pain, Starting Fri 6/1/2018, No Print      phenazopyridine (PYRIDIUM) 200 mg tablet Take 1 tablet (200 mg total) by mouth 3 (three) times a day, Starting Tue 8/25/2020, Normal             No discharge procedures on file.    PDMP Review       None            ED Provider  Electronically Signed by             Jarrett Moreno PA-C  05/08/24 [id number]

## (undated) DEVICE — ABDOMINAL PAD: Brand: DERMACEA

## (undated) DEVICE — GLOVE INDICATOR PI UNDERGLOVE SZ 6.5 BLUE

## (undated) DEVICE — ADHESIVE SKN CLSR HISTOACRYL FLEX 0.5ML LF

## (undated) DEVICE — 3M™ STERI-STRIP™ REINFORCED ADHESIVE SKIN CLOSURES, R1547, 1/2 IN X 4 IN (12 MM X 100 MM), 6 STRIPS/ENVELOPE: Brand: 3M™ STERI-STRIP™

## (undated) DEVICE — INTENDED FOR TISSUE SEPARATION, AND OTHER PROCEDURES THAT REQUIRE A SHARP SURGICAL BLADE TO PUNCTURE OR CUT.: Brand: BARD-PARKER SAFETY BLADES SIZE 11, STERILE

## (undated) DEVICE — SYRINGE 10ML LL

## (undated) DEVICE — ANTI-FOG SOLUTION WITH FOAM PAD: Brand: DEVON

## (undated) DEVICE — PVC URETHRAL CATHETER: Brand: DOVER

## (undated) DEVICE — SURGICEL 2 X 14

## (undated) DEVICE — FLEXIBLE ADHESIVE BANDAGE,X-LARGE: Brand: CURITY

## (undated) DEVICE — HARMONIC 1100 SHEARS, 36CM SHAFT LENGTH: Brand: HARMONIC

## (undated) DEVICE — TELFA NON-ADHERENT ABSORBENT DRESSING: Brand: TELFA

## (undated) DEVICE — CHLORAPREP HI-LITE 26ML ORANGE

## (undated) DEVICE — STERILE MINOR LAPAROSCOPY PACK: Brand: CARDINAL HEALTH

## (undated) DEVICE — GLOVE SRG BIOGEL ECLIPSE 6.5

## (undated) DEVICE — SUT MONOCRYL 4-0 PS-2 18 IN Y496G